# Patient Record
Sex: MALE | Race: WHITE | Employment: OTHER | URBAN - METROPOLITAN AREA
[De-identification: names, ages, dates, MRNs, and addresses within clinical notes are randomized per-mention and may not be internally consistent; named-entity substitution may affect disease eponyms.]

---

## 2017-03-31 ENCOUNTER — HOSPITAL ENCOUNTER (OUTPATIENT)
Dept: CARDIOLOGY | Age: 72
Discharge: HOME | End: 2017-03-31
Payer: COMMERCIAL

## 2017-03-31 PROCEDURE — 93280 PM DEVICE PROGR EVAL DUAL: CPT

## 2017-06-30 ENCOUNTER — HOSPITAL ENCOUNTER (OUTPATIENT)
Dept: CARDIOLOGY | Age: 72
Discharge: HOME | End: 2017-06-30
Payer: COMMERCIAL

## 2017-06-30 PROCEDURE — 93296 REM INTERROG EVL PM/IDS: CPT

## 2017-10-10 ENCOUNTER — HOSPITAL ENCOUNTER (OUTPATIENT)
Dept: CARDIOLOGY | Age: 72
Discharge: HOME | End: 2017-10-10
Payer: COMMERCIAL

## 2017-10-10 PROCEDURE — 93280 PM DEVICE PROGR EVAL DUAL: CPT

## 2018-01-09 ENCOUNTER — HOSPITAL ENCOUNTER (OUTPATIENT)
Dept: CARDIOLOGY | Age: 73
Discharge: HOME | End: 2018-01-09
Payer: COMMERCIAL

## 2018-01-09 PROCEDURE — 93296 REM INTERROG EVL PM/IDS: CPT

## 2018-04-10 ENCOUNTER — HOSPITAL ENCOUNTER (OUTPATIENT)
Dept: CARDIOLOGY | Age: 73
Discharge: HOME | End: 2018-04-10
Payer: COMMERCIAL

## 2018-04-10 PROCEDURE — 93280 PM DEVICE PROGR EVAL DUAL: CPT

## 2018-07-10 ENCOUNTER — HOSPITAL ENCOUNTER (OUTPATIENT)
Dept: CARDIOLOGY | Age: 73
Discharge: HOME | End: 2018-07-10
Payer: COMMERCIAL

## 2018-07-10 PROCEDURE — 93296 REM INTERROG EVL PM/IDS: CPT

## 2018-10-09 ENCOUNTER — HOSPITAL ENCOUNTER (OUTPATIENT)
Dept: CARDIOLOGY | Age: 73
Discharge: HOME | End: 2018-10-09
Payer: MEDICARE

## 2018-10-09 PROCEDURE — 93280 PM DEVICE PROGR EVAL DUAL: CPT

## 2019-01-08 ENCOUNTER — HOSPITAL ENCOUNTER (OUTPATIENT)
Dept: CARDIOLOGY | Age: 74
Discharge: HOME | End: 2019-01-08
Payer: COMMERCIAL

## 2019-01-08 PROCEDURE — 93296 REM INTERROG EVL PM/IDS: CPT

## 2019-04-10 ENCOUNTER — HOSPITAL ENCOUNTER (OUTPATIENT)
Dept: CARDIOLOGY | Age: 74
Discharge: HOME | End: 2019-04-10
Payer: COMMERCIAL

## 2019-04-10 PROCEDURE — 93280 PM DEVICE PROGR EVAL DUAL: CPT

## 2019-07-10 ENCOUNTER — HOSPITAL ENCOUNTER (OUTPATIENT)
Dept: CARDIOLOGY | Age: 74
Discharge: HOME | End: 2019-07-10
Payer: COMMERCIAL

## 2019-07-10 PROCEDURE — 93296 REM INTERROG EVL PM/IDS: CPT

## 2019-10-09 ENCOUNTER — HOSPITAL ENCOUNTER (OUTPATIENT)
Dept: CARDIOLOGY | Age: 74
Discharge: HOME | End: 2019-10-09
Payer: COMMERCIAL

## 2019-10-09 PROCEDURE — 93280 PM DEVICE PROGR EVAL DUAL: CPT

## 2019-10-16 LAB
ANION GAP SERPL CALCULATED.3IONS-SCNC: 12 MMOL/L (ref 10–20)
BICARBONATE: 30 MMOL/L (ref 21–32)
CHLORIDE BLD-SCNC: 104 MMOL/L (ref 98–107)
CHOLESTEROL/HDL RATIO: 2.8
CHOLESTEROL: 132 MG/DL (ref 0–199)
CK ISOENZYMES: 42 U/L (ref 0–325)
CREAT SERPL-MCNC: 1.02 MG/DL (ref 0.5–1.3)
GFR AFRICAN AMERICAN: >60 ML/MIN/1.73M2
GFR NON-AFRICAN AMERICAN: >60 ML/MIN/1.73M2
HDLC SERPL-MCNC: 48 MG/DL
LDL CHOLESTEROL: 60 MG/DL (ref 0–99)
POTASSIUM SERPL-SCNC: 4.5 MMOL/L (ref 3.5–5.3)
SODIUM BLD-SCNC: 141 MMOL/L (ref 136–145)
TRIGL SERPL-MCNC: 121 MG/DL (ref 0–149)
UREA NITROGEN: 13 MG/DL (ref 6–23)
VLDLC SERPL CALC-MCNC: 24 MG/DL (ref 0–40)

## 2020-01-09 ENCOUNTER — HOSPITAL ENCOUNTER (OUTPATIENT)
Dept: CARDIOLOGY | Age: 75
Discharge: HOME | End: 2020-01-09
Payer: MEDICARE

## 2020-01-09 PROCEDURE — 93296 REM INTERROG EVL PM/IDS: CPT

## 2020-01-14 LAB
ANION GAP SERPL CALCULATED.3IONS-SCNC: 13 MMOL/L (ref 10–20)
BICARBONATE: 30 MMOL/L (ref 21–32)
CHLORIDE BLD-SCNC: 102 MMOL/L (ref 98–107)
CREAT SERPL-MCNC: 1.2 MG/DL (ref 0.5–1.3)
ERYTHROCYTE [DISTWIDTH] IN BLOOD BY AUTOMATED COUNT: 13 % (ref 11.5–14)
GFR AFRICAN AMERICAN: 71 ML/MIN/1.73M2
GFR NON-AFRICAN AMERICAN: 59 ML/MIN/1.73M2
HCT VFR BLD CALC: 40.3 % (ref 41–52)
HEMOGLOBIN: 13 G/DL (ref 13.5–17)
MCHC RBC AUTO-ENTMCNC: 32.3 G/DL (ref 32–36)
MCV RBC AUTO: 100 FL (ref 80–100)
PLATELET # BLD: 147 X10E9/L (ref 150–450)
POTASSIUM SERPL-SCNC: 4.1 MMOL/L (ref 3.5–5.3)
RBC # BLD: 4.02 X10E12/L (ref 4.5–5.9)
SODIUM BLD-SCNC: 141 MMOL/L (ref 136–145)
UREA NITROGEN: 20 MG/DL (ref 6–23)
WBC: 4.8 X10E9/L (ref 4.4–11.3)

## 2020-01-17 ENCOUNTER — HOSPITAL ENCOUNTER (OUTPATIENT)
Dept: CARDIAC CATH/INVASIVE PROCEDURES | Age: 75
Discharge: HOME | End: 2020-01-17
Attending: INTERNAL MEDICINE | Admitting: INTERNAL MEDICINE
Payer: MEDICARE

## 2020-01-17 VITALS
DIASTOLIC BLOOD PRESSURE: 75 MMHG | OXYGEN SATURATION: 98 % | BODY MASS INDEX: 32.14 KG/M2 | TEMPERATURE: 98 F | HEIGHT: 66 IN | SYSTOLIC BLOOD PRESSURE: 147 MMHG | HEART RATE: 60 BPM | WEIGHT: 200 LBS | RESPIRATION RATE: 8 BRPM

## 2020-01-17 LAB
EKG ATRIAL RATE: 227 BPM
EKG Q-T INTERVAL: 462 MS
EKG QRS DURATION: 158 MS
EKG QTC CALCULATION (BAZETT): 484 MS
EKG R AXIS: 117 DEGREES
EKG T AXIS: -39 DEGREES
EKG VENTRICULAR RATE: 66 BPM

## 2020-01-17 PROCEDURE — 2500000003 HC RX 250 WO HCPCS

## 2020-01-17 PROCEDURE — 2580000003 HC RX 258: Performed by: INTERNAL MEDICINE

## 2020-01-17 PROCEDURE — 93005 ELECTROCARDIOGRAM TRACING: CPT

## 2020-01-17 PROCEDURE — 2580000003 HC RX 258

## 2020-01-17 PROCEDURE — 33228 REMV&REPLC PM GEN DUAL LEAD: CPT | Performed by: INTERNAL MEDICINE

## 2020-01-17 PROCEDURE — 6360000002 HC RX W HCPCS: Performed by: INTERNAL MEDICINE

## 2020-01-17 PROCEDURE — C1785 PMKR, DUAL, RATE-RESP: HCPCS

## 2020-01-17 PROCEDURE — 6360000002 HC RX W HCPCS

## 2020-01-17 PROCEDURE — 6370000000 HC RX 637 (ALT 250 FOR IP)

## 2020-01-17 PROCEDURE — 2709999900 HC NON-CHARGEABLE SUPPLY

## 2020-01-17 RX ORDER — SODIUM CHLORIDE 0.9 % (FLUSH) 0.9 %
10 SYRINGE (ML) INJECTION PRN
Status: DISCONTINUED | OUTPATIENT
Start: 2020-01-17 | End: 2020-01-17 | Stop reason: HOSPADM

## 2020-01-17 RX ORDER — CEFAZOLIN SODIUM 2 G/50ML
2 SOLUTION INTRAVENOUS ONCE
Status: COMPLETED | OUTPATIENT
Start: 2020-01-17 | End: 2020-01-17

## 2020-01-17 RX ORDER — SULFAMETHOXAZOLE AND TRIMETHOPRIM 400; 80 MG/1; MG/1
1 TABLET ORAL 2 TIMES DAILY
Qty: 6 TABLET | Refills: 0 | Status: SHIPPED | OUTPATIENT
Start: 2020-01-17 | End: 2020-01-23

## 2020-01-17 RX ORDER — SODIUM CHLORIDE 0.9 % (FLUSH) 0.9 %
10 SYRINGE (ML) INJECTION EVERY 12 HOURS SCHEDULED
Status: DISCONTINUED | OUTPATIENT
Start: 2020-01-17 | End: 2020-01-17 | Stop reason: HOSPADM

## 2020-01-17 RX ORDER — SODIUM CHLORIDE 9 MG/ML
INJECTION, SOLUTION INTRAVENOUS
Status: COMPLETED
Start: 2020-01-17 | End: 2020-01-17

## 2020-01-17 RX ORDER — ONDANSETRON 2 MG/ML
4 INJECTION INTRAMUSCULAR; INTRAVENOUS EVERY 6 HOURS PRN
Status: DISCONTINUED | OUTPATIENT
Start: 2020-01-17 | End: 2020-01-17 | Stop reason: HOSPADM

## 2020-01-17 RX ORDER — ACETAMINOPHEN 325 MG/1
650 TABLET ORAL EVERY 4 HOURS PRN
Status: DISCONTINUED | OUTPATIENT
Start: 2020-01-17 | End: 2020-01-17 | Stop reason: HOSPADM

## 2020-01-17 RX ORDER — CHLORHEXIDINE GLUCONATE 4 G/100ML
SOLUTION TOPICAL ONCE
Status: DISCONTINUED | OUTPATIENT
Start: 2020-01-17 | End: 2020-01-17 | Stop reason: HOSPADM

## 2020-01-17 RX ORDER — METOPROLOL TARTRATE 50 MG/1
100 TABLET, FILM COATED ORAL 2 TIMES DAILY
COMMUNITY

## 2020-01-17 RX ADMIN — SODIUM CHLORIDE 1000 MG: 9 INJECTION, SOLUTION INTRAVENOUS at 10:27

## 2020-01-17 RX ADMIN — SODIUM CHLORIDE 1000 ML: 9 INJECTION, SOLUTION INTRAVENOUS at 09:50

## 2020-01-17 RX ADMIN — CEFAZOLIN SODIUM 2 G: 2 SOLUTION INTRAVENOUS at 12:12

## 2020-01-17 RX ADMIN — DEXTROSE MONOHYDRATE 1 G: 5 INJECTION INTRAVENOUS at 10:02

## 2020-01-17 NOTE — PROCEDURES
Leidy Hernandez [address]                      [address], [address] [facility]                                 PROCEDURE NOTE    PATIENT NAME: Dari Youngblood                :        1945  MED REC NO:   [id number]                            ROOM:  ACCOUNT NO:   [de-identified]                           ADMIT DATE: 2020  PROVIDER:     Rogers Singer MD    DATE OF PROCEDURE:  2020    PROCEDURE:  Under fluoroscopic guidance, generator change. INDICATIONS:  The patient with sick sinus syndrome and carotid  hypersensitivity, the patient underwent pacemaker placement in . Generator is elective end of life. The patient was advised to consider  generator change. The risks of procedure including (if a new a lead is  needed), death, cardiac tamponade, pneumothorax, bleeding, infection,  lead dislodgement, thrombosis, and venous damage. He agreed to  procedure. OPERATIVE COURSE:  The patient was taken to the cardiac catheterization  lab at [facility]. The patient prepped and draped in  a sterile fashion. The patient was given IV sedation as well as 1%  Lidocaine for local anesthesia. The generator was identified under  fluoroscopic guidance. Sharp and blunt dissection was made down to the  generator can, the can was explanted. The pocket was copiously  irrigated out with antibiotic solution. The leads were found to be  adequate. A new generator was placed and 2-0 absorbable was used in  subcutaneous and fascial layers, 3-0 absorbable suture for the skin. Steri-strips, a sterile dressing and topical antibiotics were placed. At the time of this dictation, there were no apparent complications. The patient was discharged back to pre and postcatheterization area and  to look for any untoward hemodynamic effects. TECHNICAL DATA:  The atrial lead is a 5076-45 lead from [employer],  serial #[id number].   At 0.4 msec, voltage is 0.5 mV,

## 2020-01-17 NOTE — PROGRESS NOTES
Pt discharged home, left from pre/post via wheelchair, wife driving him home, discharge instructions explained and patient verbalized understanding.

## 2020-01-17 NOTE — PROGRESS NOTES
Patient arrived back to pre/post for recovery. VS WNLs. Incision to left chest area with Aquacel Ag dressing. Dressing has a little bit of blood on it from during procedure. Patient is alert and oriented. Wife at bedside.

## 2020-04-17 ENCOUNTER — HOSPITAL ENCOUNTER (OUTPATIENT)
Dept: CARDIOLOGY | Age: 75
Discharge: HOME | End: 2020-04-17
Payer: MEDICARE

## 2020-04-17 PROCEDURE — 93296 REM INTERROG EVL PM/IDS: CPT

## 2020-07-17 ENCOUNTER — HOSPITAL ENCOUNTER (OUTPATIENT)
Dept: CARDIOLOGY | Age: 75
Discharge: HOME | End: 2020-07-17
Payer: MEDICARE

## 2020-07-17 PROCEDURE — 93296 REM INTERROG EVL PM/IDS: CPT

## 2020-07-20 ENCOUNTER — NURSE ONLY (OUTPATIENT)
Dept: PRIMARY CARE CLINIC | Age: 75
End: 2020-07-20

## 2020-07-20 ENCOUNTER — HOSPITAL ENCOUNTER (OUTPATIENT)
Age: 75
Setting detail: SPECIMEN
Discharge: HOME | End: 2020-07-20
Payer: MEDICARE

## 2020-07-20 PROCEDURE — U0003 INFECTIOUS AGENT DETECTION BY NUCLEIC ACID (DNA OR RNA); SEVERE ACUTE RESPIRATORY SYNDROME CORONAVIRUS 2 (SARS-COV-2) (CORONAVIRUS DISEASE [COVID-19]), AMPLIFIED PROBE TECHNIQUE, MAKING USE OF HIGH THROUGHPUT TECHNOLOGIES AS DESCRIBED BY CMS-2020-01-R: HCPCS

## 2020-07-23 LAB
SARS-COV-2: NOT DETECTED
SOURCE: NORMAL

## 2020-07-24 ENCOUNTER — HOSPITAL ENCOUNTER (OUTPATIENT)
Dept: CARDIAC CATH/INVASIVE PROCEDURES | Age: 75
Discharge: HOME | End: 2020-07-24
Attending: INTERNAL MEDICINE | Admitting: INTERNAL MEDICINE
Payer: MEDICARE

## 2020-07-24 VITALS — OXYGEN SATURATION: 100 %

## 2020-07-24 PROBLEM — Z98.890 S/P CARDIAC CATH: Status: ACTIVE | Noted: 2020-07-24

## 2020-07-24 PROCEDURE — C1769 GUIDE WIRE: HCPCS

## 2020-07-24 PROCEDURE — 6360000004 HC RX CONTRAST MEDICATION: Performed by: INTERNAL MEDICINE

## 2020-07-24 PROCEDURE — 2580000003 HC RX 258

## 2020-07-24 PROCEDURE — 2709999900 HC NON-CHARGEABLE SUPPLY

## 2020-07-24 PROCEDURE — 2500000003 HC RX 250 WO HCPCS

## 2020-07-24 PROCEDURE — 6360000002 HC RX W HCPCS

## 2020-07-24 PROCEDURE — 93459 L HRT ART/GRFT ANGIO: CPT | Performed by: INTERNAL MEDICINE

## 2020-07-24 PROCEDURE — C1894 INTRO/SHEATH, NON-LASER: HCPCS

## 2020-07-24 PROCEDURE — C1760 CLOSURE DEV, VASC: HCPCS

## 2020-07-24 RX ORDER — SODIUM CHLORIDE 0.9 % (FLUSH) 0.9 %
10 SYRINGE (ML) INJECTION PRN
Status: DISCONTINUED | OUTPATIENT
Start: 2020-07-24 | End: 2020-07-25 | Stop reason: HOSPADM

## 2020-07-24 RX ORDER — SODIUM CHLORIDE 0.9 % (FLUSH) 0.9 %
10 SYRINGE (ML) INJECTION EVERY 12 HOURS SCHEDULED
Status: DISCONTINUED | OUTPATIENT
Start: 2020-07-24 | End: 2020-07-25 | Stop reason: HOSPADM

## 2020-07-24 RX ORDER — ACETAMINOPHEN 325 MG/1
650 TABLET ORAL EVERY 4 HOURS PRN
Status: DISCONTINUED | OUTPATIENT
Start: 2020-07-24 | End: 2020-07-25 | Stop reason: HOSPADM

## 2020-07-24 RX ORDER — SODIUM CHLORIDE 9 MG/ML
INJECTION, SOLUTION INTRAVENOUS CONTINUOUS
Status: DISCONTINUED | OUTPATIENT
Start: 2020-07-24 | End: 2020-07-25 | Stop reason: HOSPADM

## 2020-07-24 RX ORDER — NITROGLYCERIN 0.4 MG/1
0.4 TABLET SUBLINGUAL EVERY 5 MIN PRN
COMMUNITY

## 2020-07-24 RX ADMIN — IOPAMIDOL 125 ML: 612 INJECTION, SOLUTION INTRAVENOUS at 18:03

## 2020-07-24 NOTE — OP NOTE
INDICATIONS:  The patient is a 69 y.o. male with history of coronary artery disease, remote coronary artery bypass grafting, sinus node dysfunction, permanent pacemaker, nonsustained ventricular tachycardia by pacemaker check, primary cardiologist Dr. Elham Mejia recommended cardiac catheterization and possible intervention. PROCEDURE:  Left heart catheterization, coronary angiography, left ventriculography ,LV/Ao pull back and selective right common femoral angiography was performed. Patient underwent selective angiography of the left internal mammary artery ,saphenous vein graft to the ramus intermedius branch and saphenous vein graft to the right coronary artery. Benefits, alternatives and risks of the procedure were explained to the patient to include but not limited to MI, Stroke, Death, Allergic reaction to dye, bleeding, risk of kidney injury, and possible need for emergent coronary artery bypass grafting and informed consent was obtained. The patient was brought to the cath lab and was prepped and draped in the normal sterile technique. IV conscious sedation was maintained. 1% lidocaine was used for local anesthesia. DESCRIPTION OF PROCEDURE: Under local anesthesia, a 5-Cape Verdean short sheath was placed in the right common femoral artery by single anterior percutaneous stick. Selective right common femoral angiography showed that the access was in the right common femoral artery above its bifurcation. Access was very close to the bifurcation. There was significant calcification. There was aortoiliac tortuosity. The 5 [place] Verdean short sheath was therefore changed to a 5 Western Monserrat 25 cm sheath. A 5-C[place] Verdean JL4 diagnostic catheter was advanced over a 0.035 guidewire and the left main coronary artery was selectively engaged. Angiography of the left system was performed in multiple orthogonal views. The 5-C[place] Verdean JL4 diagnostic catheter and the guidewire were removed.  A 5-C[place] Verdean AR mod  diagnostic catheter was advanced over a 0.035 guidewire and the right coronary artery was selectively engaged. Angiography of the right coronary artery was performed in multiple orthogonal views. The 5 [place] Monserrat AR mod diagnostic catheter was then used to selectively engage the saphenous vein graft to the ramus intermedius branch. Angiography of the graft was performed in multiple orthogonal views. The catheter was then used to engage the saphenous vein graft to the right coronary artery, but was suboptimal.  The catheter was removed. 5 Western Monserrat multipurpose diagnostic catheter was advanced, and SVG to the right coronary artery was selectively engaged. Angiography was performed in multiple orthogonal views. The catheter was removed. 5 Western Monserrat LIMA diagnostic catheter was advanced under fluoroscopy, and the left internal mammary artery was selectively engaged. Angiography of the left internal mammary artery was performed in multiple orthogonal views. The catheter was removed. A 5-Sinhala angled pigtail catheter was advanced over a 0.035 guidewire across the aortic valve into the left ventricle. Left ventricular end-diastolic pressure was measured. Left ventriculography was performed in about 30° CHARLES view. Slow manual pullback was performed across the aortic valve. At the conclusion of the procedure the right femoral artery sheath was removed and hemostasis was achieved using Minx  hemostatic device. There were no immediate complications. HEMODYNAMIC / ANGIOGRAPHIC DATA:    1. Left ventricular end diastolic pressure was 15 to 20  mmHg. No systolic gradient across the aortic valve. 2. Left ventriculography revealed an EF around 60 %. In the CHARLES view, there is basal inferior akinesis. 3. The left main coronary artery  has 0% stenosis. Ricka Distad 4. The left anterior descending artery has diffuse calcification.   Left anterior descending artery is 100% occluded after moderately large first septal  and the first diagonal branch. Proximal left anterior descending artery has a calcified focal 70% stenosis, at the bend in the vessel, best visualized in the BRAXTON caudal view. The first septal  and the first diagonal branch have less than 10% stenosis  5. LIMA to LAD is widely patent. Distal LAD is of small caliber. Distal LAD has less than 10% stenosis. Kvng Sinclair 6. The left circumflex is nondominant. Proximal vessel has less than 10% stenosis left circumflex artery gives off a large obtuse marginal branch that bifurcates into 2 secondary branches each of which measure 2.25 to 2.5 mm. Distal left circumflex artery is small in caliber. Left circumflex system has less than 10% stenosis. 7. The right coronary artery is dominant, 100% occluded proximally. 8. SVG to ramus intermedius branch is patent, the ramus intermedius branch itself is of relatively small caliber. There is good runoff distally. The ramus intermedius branch has less than 10% stenosis  9. SVG to the right coronary artery is widely patent, with excellent distal runoff. The posterior descending artery and the posterolateral ventricular branches have less than 10% stenosis. In summary this patient has total occlusion of the proximal RCA patent SVG to distal RCA normal left main minor disease of the circumflex patent SVG to the ramus intermedius branch, heavily calcified proximal LAD with 70% stenosis at the bend in the vessel, patent LIMA to LAD, overall left ventricular ejection fraction of about 55 to 60% with basal inferior akinesis, LVEDP 15 to 20 mmHg. RECOMMENDATIONS:  Post-procedure care will focus on prevention of any ischemic events and congestive complications. Aggressive risk factor modification and dual antiplatelet therapy are recommended. Patient will follow-up with Dr. Laz Boo as previously scheduled.     Angélica Barboza MD

## 2020-07-24 NOTE — PROGRESS NOTES
Patient received to pre post cath. Alert and oriented x4. Very pleasant and free from complaint. Right groin dressing clean dry intact. Right groin soft without hematoma or bleeding. 2+ palpable DP bilateral.  Dr. Jens Josue at bedside to say hello. Patient calling wife to speak about DC home tonight.

## 2020-07-25 NOTE — FLOWSHEET NOTE
1930: Patient arrived on floor from cath lab. Right groin dressing dry and intact. Site soft, non tender. No sign of hematoma or bruising present. Patient denies pain at site. Patient to remain on bedrest until 2200.     2015: Call to  to get discharge order. Order given to discharge patient tonight. 2155: Right groin reassessed. Site remains stable. No signs of hematoma or bruising present. Site soft, non tender. 2205: Patient discharged off floor via wheelchair.

## 2020-10-16 ENCOUNTER — HOSPITAL ENCOUNTER (OUTPATIENT)
Dept: CARDIOLOGY | Age: 75
Discharge: HOME | End: 2020-10-16
Payer: MEDICARE

## 2020-10-16 PROCEDURE — 93280 PM DEVICE PROGR EVAL DUAL: CPT

## 2021-01-15 ENCOUNTER — HOSPITAL ENCOUNTER (OUTPATIENT)
Dept: CARDIOLOGY | Age: 76
Discharge: HOME | End: 2021-01-15
Payer: MEDICARE

## 2021-01-15 PROCEDURE — 93296 REM INTERROG EVL PM/IDS: CPT

## 2021-03-15 LAB — C-REACTIVE PROTEIN: 3.62 MG/DL

## 2021-05-13 ENCOUNTER — HOSPITAL ENCOUNTER (OUTPATIENT)
Dept: CARDIOLOGY | Age: 76
Discharge: HOME | End: 2021-05-13
Payer: MEDICARE

## 2021-05-13 PROCEDURE — 93296 REM INTERROG EVL PM/IDS: CPT

## 2021-07-30 LAB
ANION GAP SERPL CALCULATED.3IONS-SCNC: 10 MEQ/L (ref 9–15)
BUN BLDV-MCNC: 18 MG/DL (ref 8–23)
CHLORIDE BLD-SCNC: 106 MEQ/L (ref 95–107)
CHOLESTEROL, TOTAL: 136 MG/DL (ref 0–199)
CO2: 26 MEQ/L (ref 20–31)
CREAT SERPL-MCNC: 1.03 MG/DL (ref 0.7–1.2)
GFR AFRICAN AMERICAN: >60
GFR NON-AFRICAN AMERICAN: >60
HDLC SERPL-MCNC: 51 MG/DL (ref 40–59)
LDL CHOLESTEROL CALCULATED: 66 MG/DL (ref 0–129)
MAGNESIUM: 2.3 MG/DL (ref 1.7–2.4)
POTASSIUM SERPL-SCNC: 4.3 MEQ/L (ref 3.4–4.9)
SODIUM BLD-SCNC: 142 MEQ/L (ref 135–144)
TOTAL CK: 59 U/L (ref 0–190)
TRIGL SERPL-MCNC: 97 MG/DL (ref 0–150)

## 2021-08-17 ENCOUNTER — HOSPITAL ENCOUNTER (OUTPATIENT)
Dept: CARDIOLOGY | Age: 76
Discharge: HOME | End: 2021-08-17
Payer: MEDICARE

## 2021-08-17 PROCEDURE — 93296 REM INTERROG EVL PM/IDS: CPT

## 2021-11-16 ENCOUNTER — HOSPITAL ENCOUNTER (OUTPATIENT)
Dept: CARDIOLOGY | Age: 76
Discharge: HOME | End: 2021-11-16
Payer: MEDICARE

## 2021-11-16 PROCEDURE — 93296 REM INTERROG EVL PM/IDS: CPT

## 2022-02-16 ENCOUNTER — HOSPITAL ENCOUNTER (OUTPATIENT)
Dept: CARDIOLOGY | Age: 77
Discharge: HOME | End: 2022-02-16
Payer: MEDICARE

## 2022-02-16 PROCEDURE — 93280 PM DEVICE PROGR EVAL DUAL: CPT

## 2022-05-18 ENCOUNTER — HOSPITAL ENCOUNTER (OUTPATIENT)
Dept: CARDIOLOGY | Age: 77
Discharge: HOME | End: 2022-05-18
Payer: MEDICARE

## 2022-05-18 PROCEDURE — 93296 REM INTERROG EVL PM/IDS: CPT

## 2022-08-17 ENCOUNTER — HOSPITAL ENCOUNTER (OUTPATIENT)
Dept: CARDIOLOGY | Age: 77
Discharge: HOME | End: 2022-08-17
Payer: MEDICARE

## 2022-08-17 PROCEDURE — 93280 PM DEVICE PROGR EVAL DUAL: CPT

## 2022-11-16 ENCOUNTER — HOSPITAL ENCOUNTER (OUTPATIENT)
Dept: CARDIOLOGY | Age: 77
Discharge: HOME | End: 2022-11-16
Payer: MEDICARE

## 2022-11-16 PROCEDURE — 93296 REM INTERROG EVL PM/IDS: CPT

## 2023-01-27 LAB
CK ISOENZYMES: 43 U/L (ref 0–325)
MAGNESIUM: 2.15 MG/DL (ref 1.6–2.4)

## 2023-02-16 ENCOUNTER — HOSPITAL ENCOUNTER (OUTPATIENT)
Dept: CARDIOLOGY | Age: 78
Discharge: HOME | End: 2023-02-16
Payer: MEDICARE

## 2023-02-16 PROCEDURE — 93296 REM INTERROG EVL PM/IDS: CPT

## 2023-02-22 LAB
ANION GAP IN SER/PLAS: 11 MMOL/L (ref 10–20)
CALCIUM (MG/DL) IN SER/PLAS: 9.8 MG/DL (ref 8.6–10.3)
CARBON DIOXIDE, TOTAL (MMOL/L) IN SER/PLAS: 29 MMOL/L (ref 21–32)
CHLORIDE (MMOL/L) IN SER/PLAS: 106 MMOL/L (ref 98–107)
CHOLESTEROL (MG/DL) IN SER/PLAS: 115 MG/DL (ref 0–199)
CHOLESTEROL IN HDL (MG/DL) IN SER/PLAS: 50.1 MG/DL
CHOLESTEROL/HDL RATIO: 2.3
CREATINE KINASE (U/L) IN SER/PLAS: 39 U/L (ref 0–325)
CREATININE (MG/DL) IN SER/PLAS: 1.04 MG/DL (ref 0.5–1.3)
GFR MALE: 75 ML/MIN/1.73M2
GLUCOSE (MG/DL) IN SER/PLAS: 131 MG/DL (ref 74–99)
LDL: 47 MG/DL (ref 0–99)
MAGNESIUM (MG/DL) IN SER/PLAS: 2.45 MG/DL (ref 1.6–2.4)
POTASSIUM (MMOL/L) IN SER/PLAS: 4.9 MMOL/L (ref 3.5–5.3)
SODIUM (MMOL/L) IN SER/PLAS: 141 MMOL/L (ref 136–145)
TRIGLYCERIDE (MG/DL) IN SER/PLAS: 90 MG/DL (ref 0–149)
UREA NITROGEN (MG/DL) IN SER/PLAS: 18 MG/DL (ref 6–23)
VLDL: 18 MG/DL (ref 0–40)

## 2023-03-13 NOTE — PROCEDURES
Mirtha Medina is a 69 y.o. male patient. No diagnosis found. Past Medical History:   Diagnosis Date    Arthritis     Chicken pox     ED (erectile dysfunction)     Gas     Hay fever     Heart disease     Hemorrhoids     High blood pressure     High cholesterol     History of bronchitis     History of pneumonia     Hoarseness     Hydrocele     Indigestion     Loss of hearing     Measles     Mumps     Pacemaker     Swelling of both ankles     Thyroid trouble     Tonsillitis      Blood pressure 128/77, temperature 98 °F (36.7 °C), temperature source Temporal, resp. rate 18, height 5' 6\" (1.676 m), weight 200 lb (90.7 kg), SpO2 97 %. Procedures   See full dictation on January 17. Assessment:  Patient underwent successful generator change. The patient had adequate pacing and sensing thresholds in both the atrial and ventricular lead. The ventricular lead is epicardial and not MRI compatible. So even though this patient has an MRI compatible generator, due to the ventricular lead, he would not be a candidate for routine MRI. Plan:  1 more dose of IV antibiotics, then discharged home with oral Bactrim for 3 days. Hold aspirin for a couple of days. Pacemaker incision check will be scheduled for next week.     Jaxson Fletcher MD  1/17/2020 FRANCIS Garcia PA-C

## 2023-04-10 LAB
ALBUMIN (G/DL) IN SER/PLAS: 4 G/DL (ref 3.4–5)
ANION GAP IN SER/PLAS: 12 MMOL/L (ref 10–20)
CALCIUM (MG/DL) IN SER/PLAS: 9.4 MG/DL (ref 8.6–10.3)
CARBON DIOXIDE, TOTAL (MMOL/L) IN SER/PLAS: 28 MMOL/L (ref 21–32)
CHLORIDE (MMOL/L) IN SER/PLAS: 106 MMOL/L (ref 98–107)
CREATININE (MG/DL) IN SER/PLAS: 1.06 MG/DL (ref 0.5–1.3)
ERYTHROCYTE DISTRIBUTION WIDTH (RATIO) BY AUTOMATED COUNT: 13.2 % (ref 11.5–14.5)
ERYTHROCYTE MEAN CORPUSCULAR HEMOGLOBIN CONCENTRATION (G/DL) BY AUTOMATED: 32.4 G/DL (ref 32–36)
ERYTHROCYTE MEAN CORPUSCULAR VOLUME (FL) BY AUTOMATED COUNT: 100 FL (ref 80–100)
ERYTHROCYTES (10*6/UL) IN BLOOD BY AUTOMATED COUNT: 4.19 X10E12/L (ref 4.5–5.9)
GFR MALE: 73 ML/MIN/1.73M2
GLUCOSE (MG/DL) IN SER/PLAS: 138 MG/DL (ref 74–99)
HEMATOCRIT (%) IN BLOOD BY AUTOMATED COUNT: 42 % (ref 41–52)
HEMOGLOBIN (G/DL) IN BLOOD: 13.6 G/DL (ref 13.5–17.5)
LEUKOCYTES (10*3/UL) IN BLOOD BY AUTOMATED COUNT: 4.9 X10E9/L (ref 4.4–11.3)
PHOSPHATE (MG/DL) IN SER/PLAS: 3.2 MG/DL (ref 2.5–4.9)
PLATELETS (10*3/UL) IN BLOOD AUTOMATED COUNT: 142 X10E9/L (ref 150–450)
POTASSIUM (MMOL/L) IN SER/PLAS: 4.8 MMOL/L (ref 3.5–5.3)
PROSTATE SPECIFIC ANTIGEN,SCREEN: 0.57 NG/ML (ref 0–4)
SODIUM (MMOL/L) IN SER/PLAS: 141 MMOL/L (ref 136–145)
THYROTROPIN (MIU/L) IN SER/PLAS BY DETECTION LIMIT <= 0.05 MIU/L: 1.73 MIU/L (ref 0.44–3.98)
UREA NITROGEN (MG/DL) IN SER/PLAS: 16 MG/DL (ref 6–23)

## 2023-05-19 ENCOUNTER — HOSPITAL ENCOUNTER (OUTPATIENT)
Dept: CARDIOLOGY | Age: 78
Discharge: HOME | End: 2023-05-19
Payer: MEDICARE

## 2023-05-19 PROCEDURE — 93280 PM DEVICE PROGR EVAL DUAL: CPT

## 2023-07-17 LAB
ALANINE AMINOTRANSFERASE (SGPT) (U/L) IN SER/PLAS: 22 U/L (ref 10–52)
ALBUMIN (G/DL) IN SER/PLAS: 4.1 G/DL (ref 3.4–5)
ALBUMIN (MG/L) IN URINE: 11 MG/L
ALBUMIN/CREATININE (UG/MG) IN URINE: 10.4 UG/MG CRT (ref 0–30)
ALKALINE PHOSPHATASE (U/L) IN SER/PLAS: 75 U/L (ref 33–136)
ANION GAP IN SER/PLAS: 10 MMOL/L (ref 10–20)
ASPARTATE AMINOTRANSFERASE (SGOT) (U/L) IN SER/PLAS: 20 U/L (ref 9–39)
BILIRUBIN TOTAL (MG/DL) IN SER/PLAS: 1.4 MG/DL (ref 0–1.2)
CALCIDIOL (25 OH VITAMIN D3) (NG/ML) IN SER/PLAS: 50 NG/ML
CALCIUM (MG/DL) IN SER/PLAS: 9.1 MG/DL (ref 8.6–10.3)
CARBON DIOXIDE, TOTAL (MMOL/L) IN SER/PLAS: 27 MMOL/L (ref 21–32)
CHLORIDE (MMOL/L) IN SER/PLAS: 108 MMOL/L (ref 98–107)
CHOLESTEROL (MG/DL) IN SER/PLAS: 120 MG/DL (ref 0–199)
CHOLESTEROL IN HDL (MG/DL) IN SER/PLAS: 51 MG/DL
CHOLESTEROL/HDL RATIO: 2.4
CREATININE (MG/DL) IN SER/PLAS: 1.21 MG/DL (ref 0.5–1.3)
CREATININE (MG/DL) IN URINE: 106 MG/DL (ref 20–370)
ESTIMATED AVERAGE GLUCOSE FOR HBA1C: 151 MG/DL
GFR MALE: 62 ML/MIN/1.73M2
GLUCOSE (MG/DL) IN SER/PLAS: 137 MG/DL (ref 74–99)
HEMOGLOBIN A1C/HEMOGLOBIN TOTAL IN BLOOD: 6.9 %
LDL: 54 MG/DL (ref 0–99)
POTASSIUM (MMOL/L) IN SER/PLAS: 4.4 MMOL/L (ref 3.5–5.3)
PROTEIN TOTAL: 6.7 G/DL (ref 6.4–8.2)
SODIUM (MMOL/L) IN SER/PLAS: 141 MMOL/L (ref 136–145)
THYROTROPIN (MIU/L) IN SER/PLAS BY DETECTION LIMIT <= 0.05 MIU/L: 3.26 MIU/L (ref 0.44–3.98)
THYROXINE (T4) FREE (NG/DL) IN SER/PLAS: 0.81 NG/DL (ref 0.61–1.12)
TRIGLYCERIDE (MG/DL) IN SER/PLAS: 77 MG/DL (ref 0–149)
UREA NITROGEN (MG/DL) IN SER/PLAS: 15 MG/DL (ref 6–23)
VLDL: 15 MG/DL (ref 0–40)

## 2023-08-17 ENCOUNTER — HOSPITAL ENCOUNTER (OUTPATIENT)
Dept: CARDIOLOGY | Age: 78
Discharge: HOME | End: 2023-08-17
Payer: MEDICARE

## 2023-08-17 PROCEDURE — 93296 REM INTERROG EVL PM/IDS: CPT

## 2023-10-13 ENCOUNTER — LAB (OUTPATIENT)
Dept: LAB | Facility: LAB | Age: 78
End: 2023-10-13
Payer: MEDICARE

## 2023-10-13 DIAGNOSIS — J44.9 CHRONIC OBSTRUCTIVE PULMONARY DISEASE, UNSPECIFIED (MULTI): ICD-10-CM

## 2023-10-13 DIAGNOSIS — Z12.5 ENCOUNTER FOR SCREENING FOR MALIGNANT NEOPLASM OF PROSTATE: Primary | ICD-10-CM

## 2023-10-13 DIAGNOSIS — Z12.5 ENCOUNTER FOR SCREENING FOR MALIGNANT NEOPLASM OF PROSTATE: ICD-10-CM

## 2023-10-13 DIAGNOSIS — I10 ESSENTIAL (PRIMARY) HYPERTENSION: ICD-10-CM

## 2023-10-13 LAB
ERYTHROCYTE [DISTWIDTH] IN BLOOD BY AUTOMATED COUNT: 13.4 % (ref 11.5–14.5)
HCT VFR BLD AUTO: 41.9 % (ref 41–52)
HGB BLD-MCNC: 13.5 G/DL (ref 13.5–17.5)
MCH RBC QN AUTO: 32.7 PG (ref 26–34)
MCHC RBC AUTO-ENTMCNC: 32.2 G/DL (ref 32–36)
MCV RBC AUTO: 102 FL (ref 80–100)
NRBC BLD-RTO: 0 /100 WBCS (ref 0–0)
PLATELET # BLD AUTO: 134 X10*3/UL (ref 150–450)
PMV BLD AUTO: 10.2 FL (ref 7.5–11.5)
RBC # BLD AUTO: 4.13 X10*6/UL (ref 4.5–5.9)
WBC # BLD AUTO: 4.6 X10*3/UL (ref 4.4–11.3)

## 2023-10-13 PROCEDURE — 85027 COMPLETE CBC AUTOMATED: CPT

## 2023-10-13 PROCEDURE — 36415 COLL VENOUS BLD VENIPUNCTURE: CPT

## 2023-10-16 ENCOUNTER — LAB (OUTPATIENT)
Dept: LAB | Facility: LAB | Age: 78
End: 2023-10-16
Payer: MEDICARE

## 2023-10-16 DIAGNOSIS — J44.9 CHRONIC OBSTRUCTIVE PULMONARY DISEASE, UNSPECIFIED (MULTI): ICD-10-CM

## 2023-10-16 DIAGNOSIS — Z12.5 ENCOUNTER FOR SCREENING FOR MALIGNANT NEOPLASM OF PROSTATE: ICD-10-CM

## 2023-10-16 DIAGNOSIS — I10 ESSENTIAL (PRIMARY) HYPERTENSION: ICD-10-CM

## 2023-10-16 LAB — PSA SERPL-MCNC: 0.55 NG/ML

## 2023-10-16 PROCEDURE — G0103 PSA SCREENING: HCPCS

## 2023-10-16 PROCEDURE — 36415 COLL VENOUS BLD VENIPUNCTURE: CPT

## 2023-11-07 PROBLEM — I44.0 FIRST DEGREE AV BLOCK: Status: ACTIVE | Noted: 2023-11-07

## 2023-11-07 PROBLEM — Z95.1 S/P CABG X 3: Status: ACTIVE | Noted: 2023-11-07

## 2023-11-07 PROBLEM — I47.29 NSVT (NONSUSTAINED VENTRICULAR TACHYCARDIA) (MULTI): Status: ACTIVE | Noted: 2023-11-07

## 2023-11-07 PROBLEM — Z86.79 HISTORY OF CAROTID ARTERY STENOSIS: Status: ACTIVE | Noted: 2023-11-07

## 2023-11-07 PROBLEM — I35.1 AORTIC VALVE INSUFFICIENCY, ACQUIRED: Status: ACTIVE | Noted: 2023-11-07

## 2023-11-07 PROBLEM — G90.01: Status: ACTIVE | Noted: 2023-11-07

## 2023-11-07 PROBLEM — E11.9 DIABETES MELLITUS (MULTI): Status: ACTIVE | Noted: 2023-11-07

## 2023-11-07 PROBLEM — I25.10 CAD (CORONARY ARTERY DISEASE): Status: ACTIVE | Noted: 2023-11-07

## 2023-11-07 PROBLEM — E78.5 HYPERLIPIDEMIA: Status: ACTIVE | Noted: 2023-11-07

## 2023-11-07 PROBLEM — I45.10 RIGHT BUNDLE BRANCH BLOCK (RBBB): Status: ACTIVE | Noted: 2023-11-07

## 2023-11-07 PROBLEM — I10 HYPERTENSION: Status: ACTIVE | Noted: 2023-11-07

## 2023-11-07 PROBLEM — I65.22 ASYMPTOMATIC STENOSIS OF LEFT CAROTID ARTERY: Status: ACTIVE | Noted: 2023-11-07

## 2023-11-07 PROBLEM — Z95.828 PRESENCE OF STENT IN ARTERY: Status: ACTIVE | Noted: 2023-11-07

## 2023-11-07 RX ORDER — METOPROLOL SUCCINATE 100 MG/1
TABLET, EXTENDED RELEASE ORAL 2 TIMES DAILY
COMMUNITY
End: 2024-04-18 | Stop reason: SDUPTHER

## 2023-11-07 RX ORDER — NITROGLYCERIN 0.4 MG/1
TABLET SUBLINGUAL
COMMUNITY

## 2023-11-07 RX ORDER — ASPIRIN 81 MG/1
1 TABLET ORAL DAILY
COMMUNITY

## 2023-11-07 RX ORDER — ATORVASTATIN CALCIUM 40 MG/1
40 TABLET, FILM COATED ORAL NIGHTLY
COMMUNITY
Start: 2023-08-26 | End: 2024-02-19

## 2023-11-07 RX ORDER — LOSARTAN POTASSIUM 25 MG/1
25 TABLET ORAL DAILY
COMMUNITY
Start: 2023-08-26 | End: 2024-02-19

## 2023-11-07 RX ORDER — LANOLIN ALCOHOL/MO/W.PET/CERES
1 CREAM (GRAM) TOPICAL DAILY
COMMUNITY

## 2023-11-07 RX ORDER — DAPAGLIFLOZIN 10 MG/1
1 TABLET, FILM COATED ORAL DAILY
COMMUNITY
Start: 2022-05-10

## 2023-11-07 RX ORDER — EZETIMIBE 10 MG/1
1 TABLET ORAL DAILY
COMMUNITY
End: 2024-02-19

## 2023-11-07 RX ORDER — ACETAMINOPHEN 500 MG
1 TABLET ORAL DAILY
COMMUNITY

## 2023-11-07 RX ORDER — METFORMIN HYDROCHLORIDE 500 MG/1
1 TABLET ORAL 2 TIMES DAILY
COMMUNITY

## 2023-11-27 ENCOUNTER — OFFICE VISIT (OUTPATIENT)
Dept: CARDIOLOGY | Facility: CLINIC | Age: 78
End: 2023-11-27
Payer: MEDICARE

## 2023-11-27 VITALS
HEART RATE: 77 BPM | WEIGHT: 200 LBS | HEIGHT: 67 IN | DIASTOLIC BLOOD PRESSURE: 75 MMHG | SYSTOLIC BLOOD PRESSURE: 129 MMHG | BODY MASS INDEX: 31.39 KG/M2

## 2023-11-27 DIAGNOSIS — I25.85 CHRONIC CORONARY MICROVASCULAR DYSFUNCTION: ICD-10-CM

## 2023-11-27 DIAGNOSIS — I47.29 NSVT (NONSUSTAINED VENTRICULAR TACHYCARDIA) (MULTI): Primary | ICD-10-CM

## 2023-11-27 DIAGNOSIS — Z87.891 FORMER SMOKER: ICD-10-CM

## 2023-11-27 DIAGNOSIS — I44.0 FIRST DEGREE AV BLOCK: ICD-10-CM

## 2023-11-27 DIAGNOSIS — Z95.0 PACEMAKER: ICD-10-CM

## 2023-11-27 DIAGNOSIS — I1A.0 RESISTANT HYPERTENSION: ICD-10-CM

## 2023-11-27 PROCEDURE — 3074F SYST BP LT 130 MM HG: CPT | Performed by: INTERNAL MEDICINE

## 2023-11-27 PROCEDURE — 1036F TOBACCO NON-USER: CPT | Performed by: INTERNAL MEDICINE

## 2023-11-27 PROCEDURE — 99214 OFFICE O/P EST MOD 30 MIN: CPT | Performed by: INTERNAL MEDICINE

## 2023-11-27 PROCEDURE — 3078F DIAST BP <80 MM HG: CPT | Performed by: INTERNAL MEDICINE

## 2023-11-27 PROCEDURE — 1159F MED LIST DOCD IN RCRD: CPT | Performed by: INTERNAL MEDICINE

## 2023-11-27 NOTE — PROGRESS NOTES
CARDIOLOGY OFFICE VISIT      CHIEF COMPLAINT  Chief Complaint   Patient presents with    Follow-up       HISTORY OF PRESENT ILLNESS  HPI    78-year-old male with a past medical history of hypertension. He has a history of coronary artery disease with prior myocardial infarction in 2006 with subsequent percutaneous coronary interventions and stenting of the right coronary artery and then subsequent coronary bypass graft surgery October 2006 with a left internal mammary artery to left anterior descending artery, saphenous vein graft to the ramus and right coronary artery. Stress test in 2014 shows no evidence of ischemia with a left ventricular ejection fraction 72%. History of hyperlipidemia. 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 His device has been showing episodes of supraventricular tachycardia with brief episodes of ventricular arrhythmias (nonsustained) for the last few years. Since 2020, the device has been showing episodes of nonsustained ventricular tachycardia with episodes up to 9 seconds of duration. His cardiac data includes a cardiac catheterization performed July 2020 that shows left ventricular ejection fraction 60%. Left main artery normal. Left anterior descending with 100% occluded after moderately large first septal  and first of another branch. Proximal left anterior descending artery has 70% stenosis. LIMA to LAD widely patent. Circumflex nondominant. Proximal vessel less than 10% stenosis. Right coronary artery 100% occluded proximally. SVG to ramus intermedius patent. SVG to right coronary artery widely patent with excellent distal runoff. Medical therapy was recommended. In the last week visit he has been doing well. He denies any symptoms of chest pain or shortness of breath or  palpitations.    Echocardiogram performed March 2023 shows left ventricular ejection fraction of 60%. 1-2+ tricuspid regurgitation with mild concentric ventricular hypertrophy.    Since the last office visit, he has been doing well.  He denies any symptoms of chest pain or shortness of breath or palpitations.    Patient had a TSH in July 2023 that shows 3.76.  LFTs normal.  Creatinine 1.29.    Patient states that he had a device interrogation recently at [facility].  This is not available during this evaluation.    Past Medical History  No past medical history on file.    Social History  Social History     Tobacco Use    Smoking status: Former     Types: Cigarettes    Smokeless tobacco: Never   Substance Use Topics    Alcohol use: Yes    Drug use: Not Currently       Family History   No family history on file.     Allergies:  Allergies   Allergen Reactions    Ciprofloxacin Shortness of breath    Tramadol Dizziness and Shortness of breath    Shellfish Containing Products Itching        Outpatient Medications:  Current Outpatient Medications   Medication Instructions    aspirin 81 mg EC tablet 1 tablet, oral, Daily    atorvastatin (LIPITOR) 40 mg, oral, Nightly    cholecalciferol (Vitamin D3) 5,000 Units tablet 1 tablet, oral, Daily    dapagliflozin propanediol (Farxiga) 10 mg 1 tablet, oral, Daily    ezetimibe (Zetia) 10 mg tablet 1 tablet, oral, Daily    losartan (COZAAR) 25 mg, oral, Daily    magnesium oxide (Mag-Ox) 400 mg (241.3 mg magnesium) tablet 1 tablet, oral, Daily    metFORMIN (Glucophage) 500 mg tablet 1 tablet, oral, 2 times daily    metoprolol succinate XL (Toprol-XL) 100 mg 24 hr tablet oral, 2 times daily    nitroglycerin (Nitrostat) 0.4 mg SL tablet sublingual          REVIEW OF SYSTEMS  Review of Systems   All other systems reviewed and are negative.      VITALS  Vitals:    11/27/23 0942   BP: 129/75   Pulse: 77       PHYSICAL EXAM  Constitutional:       Appearance: Healthy appearance. Not  in distress.   Neck:      Vascular: No JVR. JVD normal.   Pulmonary:      Effort: Pulmonary effort is normal.      Breath sounds: Normal breath sounds. No wheezing. No rhonchi. No rales.   Chest:      Chest wall: Not tender to palpatation.   Cardiovascular:      PMI at left midclavicular line. Normal rate. Regular rhythm. Normal S1. Normal S2.       Murmurs: There is no murmur.      No gallop.  No click. No rub.   Pulses:     Intact distal pulses.   Edema:     Peripheral edema absent.   Abdominal:      General: Bowel sounds are normal.      Palpations: Abdomen is soft.      Tenderness: There is no abdominal tenderness.   Musculoskeletal: Normal range of motion.         General: No tenderness. Skin:     General: Skin is warm and dry.   Neurological:      General: No focal deficit present.      Mental Status: Alert and oriented to person, place and time.       ASSESSMENT AND PLAN    Clinical impression    1. Ventricular tachycardia seen on device interrogations  2. Carotid sinus hypersensitivity status post dual-chamber pacemaker implanted in 2008, with recent generator change out in 2024 DAMARI parameters  3. Coronary artery disease with percutaneous coronary interventions in the past and coronary artery bypass graft surgery as described above  4. Normal left ventricular function per echocardiogram as described above  5. Hypertension  6. No significant coronary artery disease per cardiac catheterization as described above  7. Hyperlipidemia  8. History of nonsustained supraventricular tachycardia seen on device interrogations  9. Diabetes mellitus   10. Electrophysiology study with no inducible monomorphic ventricular tachycardia in 2020  11. Covid-19 infection in March 2021. Recovered      Plan-recommendations    We will obtain device interrogations from device clinic at [facility].    Follow my office every 6 months or sooner needed.    Will continue watching the burden of atrial ventricular arrhythmias by  device interrogation.    Risk factor modification and lifestyle modification discussed with patient. Diet , exercise and hydration discussed with patient.    I have personally review with patient during this office visit, laboratory data, echocardiogram results, stress test results, Holter-event monitor results prior and after the last electrophysiology visit. All questions has been answered.    Please excuse any errors in grammar or translation related to this dictation.  Voice recognition software was utilized to prepare this document.

## 2023-12-19 PROCEDURE — 93294 REM INTERROG EVL PM/LDLS PM: CPT | Performed by: INTERNAL MEDICINE

## 2023-12-27 ENCOUNTER — HOSPITAL ENCOUNTER (OUTPATIENT)
Dept: RESPIRATORY THERAPY | Facility: HOSPITAL | Age: 78
Discharge: HOME | End: 2023-12-27
Payer: MEDICARE

## 2023-12-27 DIAGNOSIS — I1A.0 RESISTANT HYPERTENSION: ICD-10-CM

## 2023-12-27 DIAGNOSIS — Z95.0 PACEMAKER: ICD-10-CM

## 2023-12-27 DIAGNOSIS — Z87.891 FORMER SMOKER: ICD-10-CM

## 2023-12-27 DIAGNOSIS — I44.0 FIRST DEGREE AV BLOCK: ICD-10-CM

## 2023-12-27 DIAGNOSIS — I47.29 NSVT (NONSUSTAINED VENTRICULAR TACHYCARDIA) (MULTI): ICD-10-CM

## 2023-12-27 DIAGNOSIS — I25.85 CHRONIC CORONARY MICROVASCULAR DYSFUNCTION: ICD-10-CM

## 2023-12-27 PROCEDURE — 94200 LUNG FUNCTION TEST (MBC/MVV): CPT

## 2024-01-01 LAB
MGC ASCENT PFT - FEV1 - POST: 2
MGC ASCENT PFT - FEV1 - PRE: 1.94
MGC ASCENT PFT - FEV1 - PREDICTED: 2.59
MGC ASCENT PFT - FVC - POST: 2.67
MGC ASCENT PFT - FVC - PRE: 2.62
MGC ASCENT PFT - FVC - PREDICTED: 3.45

## 2024-01-15 ENCOUNTER — LAB (OUTPATIENT)
Dept: LAB | Facility: LAB | Age: 79
End: 2024-01-15
Payer: MEDICARE

## 2024-01-15 DIAGNOSIS — E78.2 MIXED HYPERLIPIDEMIA: ICD-10-CM

## 2024-01-15 DIAGNOSIS — R53.83 OTHER FATIGUE: ICD-10-CM

## 2024-01-15 DIAGNOSIS — E11.65 TYPE 2 DIABETES MELLITUS WITH HYPERGLYCEMIA (MULTI): Primary | ICD-10-CM

## 2024-01-15 DIAGNOSIS — E55.9 VITAMIN D DEFICIENCY, UNSPECIFIED: ICD-10-CM

## 2024-01-15 LAB
25(OH)D3 SERPL-MCNC: 43 NG/ML (ref 30–100)
ALBUMIN SERPL BCP-MCNC: 4.2 G/DL (ref 3.4–5)
ALP SERPL-CCNC: 79 U/L (ref 33–136)
ALT SERPL W P-5'-P-CCNC: 19 U/L (ref 10–52)
ANION GAP SERPL CALC-SCNC: 11 MMOL/L (ref 10–20)
AST SERPL W P-5'-P-CCNC: 16 U/L (ref 9–39)
BILIRUB SERPL-MCNC: 1.4 MG/DL (ref 0–1.2)
BUN SERPL-MCNC: 17 MG/DL (ref 6–23)
CALCIUM SERPL-MCNC: 9.6 MG/DL (ref 8.6–10.3)
CHLORIDE SERPL-SCNC: 105 MMOL/L (ref 98–107)
CHOLEST SERPL-MCNC: 119 MG/DL (ref 0–199)
CHOLESTEROL/HDL RATIO: 2.3
CO2 SERPL-SCNC: 29 MMOL/L (ref 21–32)
CREAT SERPL-MCNC: 1.17 MG/DL (ref 0.5–1.3)
CREAT UR-MCNC: 92.6 MG/DL (ref 20–370)
EGFRCR SERPLBLD CKD-EPI 2021: 65 ML/MIN/1.73M*2
EST. AVERAGE GLUCOSE BLD GHB EST-MCNC: 163 MG/DL
GLUCOSE SERPL-MCNC: 135 MG/DL (ref 74–99)
HBA1C MFR BLD: 7.3 %
HDLC SERPL-MCNC: 52.5 MG/DL
LDLC SERPL CALC-MCNC: 50 MG/DL
MICROALBUMIN UR-MCNC: 14.7 MG/L
MICROALBUMIN/CREAT UR: 15.9 UG/MG CREAT
NON HDL CHOLESTEROL: 67 MG/DL (ref 0–149)
POTASSIUM SERPL-SCNC: 4.8 MMOL/L (ref 3.5–5.3)
PROT SERPL-MCNC: 6.4 G/DL (ref 6.4–8.2)
SODIUM SERPL-SCNC: 140 MMOL/L (ref 136–145)
TRIGL SERPL-MCNC: 81 MG/DL (ref 0–149)
TSH SERPL-ACNC: 2.08 MIU/L (ref 0.44–3.98)
VLDL: 16 MG/DL (ref 0–40)

## 2024-01-15 PROCEDURE — 82306 VITAMIN D 25 HYDROXY: CPT

## 2024-01-15 PROCEDURE — 36415 COLL VENOUS BLD VENIPUNCTURE: CPT

## 2024-01-15 PROCEDURE — 82570 ASSAY OF URINE CREATININE: CPT

## 2024-01-15 PROCEDURE — 84443 ASSAY THYROID STIM HORMONE: CPT

## 2024-01-15 PROCEDURE — 80053 COMPREHEN METABOLIC PANEL: CPT

## 2024-01-15 PROCEDURE — 80061 LIPID PANEL: CPT

## 2024-01-15 PROCEDURE — 83036 HEMOGLOBIN GLYCOSYLATED A1C: CPT

## 2024-01-15 PROCEDURE — 82043 UR ALBUMIN QUANTITATIVE: CPT

## 2024-01-26 ENCOUNTER — TRANSCRIBE ORDERS (OUTPATIENT)
Dept: CARDIOLOGY | Facility: CLINIC | Age: 79
End: 2024-01-26
Payer: MEDICARE

## 2024-01-26 DIAGNOSIS — I25.85 CHRONIC CORONARY MICROVASCULAR DYSFUNCTION: ICD-10-CM

## 2024-01-26 DIAGNOSIS — I65.22 ASYMPTOMATIC STENOSIS OF LEFT CAROTID ARTERY: ICD-10-CM

## 2024-01-26 DIAGNOSIS — I45.10 RIGHT BUNDLE BRANCH BLOCK (RBBB): ICD-10-CM

## 2024-02-19 DIAGNOSIS — E78.5 HYPERLIPIDEMIA, UNSPECIFIED: ICD-10-CM

## 2024-02-19 DIAGNOSIS — I10 ESSENTIAL (PRIMARY) HYPERTENSION: ICD-10-CM

## 2024-02-19 RX ORDER — EZETIMIBE 10 MG/1
10 TABLET ORAL DAILY
Qty: 90 TABLET | Refills: 1 | Status: SHIPPED | OUTPATIENT
Start: 2024-02-19

## 2024-02-19 RX ORDER — LOSARTAN POTASSIUM 25 MG/1
25 TABLET ORAL DAILY
Qty: 90 TABLET | Refills: 1 | Status: SHIPPED | OUTPATIENT
Start: 2024-02-19

## 2024-02-19 RX ORDER — ATORVASTATIN CALCIUM 40 MG/1
40 TABLET, FILM COATED ORAL NIGHTLY
Qty: 90 TABLET | Refills: 1 | Status: SHIPPED | OUTPATIENT
Start: 2024-02-19

## 2024-03-01 ENCOUNTER — CLINICAL SUPPORT (OUTPATIENT)
Dept: CARDIOLOGY | Facility: CLINIC | Age: 79
End: 2024-03-01
Payer: MEDICARE

## 2024-03-01 DIAGNOSIS — I65.22 ASYMPTOMATIC STENOSIS OF LEFT CAROTID ARTERY: ICD-10-CM

## 2024-03-01 DIAGNOSIS — I25.85 CHRONIC CORONARY MICROVASCULAR DYSFUNCTION: ICD-10-CM

## 2024-03-01 DIAGNOSIS — I45.10 RIGHT BUNDLE BRANCH BLOCK (RBBB): ICD-10-CM

## 2024-03-01 LAB
AORTIC VALVE MEAN GRADIENT: 5 MMHG
AORTIC VALVE PEAK VELOCITY: 1.48 M/S
AV PEAK GRADIENT: 8.8 MMHG
AVA (PEAK VEL): 2.93 CM2
AVA (VTI): 2.81 CM2
EJECTION FRACTION APICAL 4 CHAMBER: 41.7
EJECTION FRACTION: 50 %
LEFT VENTRICLE INTERNAL DIMENSION DIASTOLE: 4.66 CM (ref 3.5–6)
LEFT VENTRICULAR OUTFLOW TRACT DIAMETER: 2.2 CM
MITRAL VALVE E/A RATIO: 0.73
MITRAL VALVE E/E' RATIO: 7.1
RIGHT VENTRICLE PEAK SYSTOLIC PRESSURE: 28.4 MMHG

## 2024-03-01 PROCEDURE — 2500000004 HC RX 250 GENERAL PHARMACY W/ HCPCS (ALT 636 FOR OP/ED): Performed by: INTERNAL MEDICINE

## 2024-03-01 PROCEDURE — 93880 EXTRACRANIAL BILAT STUDY: CPT

## 2024-03-01 PROCEDURE — 93306 TTE W/DOPPLER COMPLETE: CPT | Performed by: INTERNAL MEDICINE

## 2024-03-01 PROCEDURE — 93880 EXTRACRANIAL BILAT STUDY: CPT | Performed by: INTERNAL MEDICINE

## 2024-03-01 PROCEDURE — 93306 TTE W/DOPPLER COMPLETE: CPT

## 2024-03-01 RX ADMIN — HUMAN ALBUMIN MICROSPHERES AND PERFLUTREN 0.5 ML: 10; .22 INJECTION, SOLUTION INTRAVENOUS at 09:10

## 2024-03-20 ENCOUNTER — HOSPITAL ENCOUNTER (OUTPATIENT)
Dept: CARDIOLOGY | Age: 79
Discharge: HOME | End: 2024-03-20
Payer: MEDICARE

## 2024-03-20 PROCEDURE — 93296 REM INTERROG EVL PM/IDS: CPT

## 2024-03-21 DIAGNOSIS — Z95.0 PACEMAKER: ICD-10-CM

## 2024-04-18 DIAGNOSIS — I47.29 NSVT (NONSUSTAINED VENTRICULAR TACHYCARDIA) (MULTI): ICD-10-CM

## 2024-04-18 DIAGNOSIS — I10 PRIMARY HYPERTENSION: ICD-10-CM

## 2024-04-18 RX ORDER — METOPROLOL SUCCINATE 100 MG/1
100 TABLET, EXTENDED RELEASE ORAL DAILY
Qty: 90 TABLET | Refills: 0 | Status: SHIPPED | OUTPATIENT
Start: 2024-04-18 | End: 2024-04-23

## 2024-04-23 ENCOUNTER — TELEPHONE (OUTPATIENT)
Dept: CARDIOLOGY | Facility: CLINIC | Age: 79
End: 2024-04-23
Payer: MEDICARE

## 2024-04-23 DIAGNOSIS — I10 PRIMARY HYPERTENSION: ICD-10-CM

## 2024-04-23 DIAGNOSIS — I47.29 NSVT (NONSUSTAINED VENTRICULAR TACHYCARDIA) (MULTI): ICD-10-CM

## 2024-04-23 RX ORDER — METOPROLOL SUCCINATE 100 MG/1
100 TABLET, EXTENDED RELEASE ORAL 2 TIMES DAILY
Qty: 180 TABLET | Refills: 3 | Status: SHIPPED | OUTPATIENT
Start: 2024-04-23

## 2024-04-23 NOTE — TELEPHONE ENCOUNTER
You approved the last Rx but not sure who changed it originally. I fixed it and sent you the new Rx to approve.

## 2024-04-23 NOTE — TELEPHONE ENCOUNTER
Pt received an Rx for Metoprolol Succinate 100mg- take one tablet daily. Pt states he takes this medications twice daily and was unaware of any change to his medication. Pt asking for clarification.

## 2024-06-03 ENCOUNTER — OFFICE VISIT (OUTPATIENT)
Dept: CARDIOLOGY | Facility: CLINIC | Age: 79
End: 2024-06-03
Payer: MEDICARE

## 2024-06-03 VITALS
HEART RATE: 62 BPM | BODY MASS INDEX: 30.54 KG/M2 | DIASTOLIC BLOOD PRESSURE: 62 MMHG | HEIGHT: 67 IN | WEIGHT: 194.6 LBS | SYSTOLIC BLOOD PRESSURE: 128 MMHG

## 2024-06-03 DIAGNOSIS — I25.10 CORONARY ARTERY DISEASE INVOLVING NATIVE CORONARY ARTERY OF NATIVE HEART WITHOUT ANGINA PECTORIS: ICD-10-CM

## 2024-06-03 DIAGNOSIS — Z95.0 PACEMAKER: ICD-10-CM

## 2024-06-03 DIAGNOSIS — I10 PRIMARY HYPERTENSION: ICD-10-CM

## 2024-06-03 DIAGNOSIS — Z87.891 FORMER SMOKER: ICD-10-CM

## 2024-06-03 DIAGNOSIS — I47.29 NSVT (NONSUSTAINED VENTRICULAR TACHYCARDIA) (MULTI): ICD-10-CM

## 2024-06-03 DIAGNOSIS — I44.0 FIRST DEGREE AV BLOCK: Primary | ICD-10-CM

## 2024-06-03 PROCEDURE — 99214 OFFICE O/P EST MOD 30 MIN: CPT | Performed by: INTERNAL MEDICINE

## 2024-06-03 PROCEDURE — 3074F SYST BP LT 130 MM HG: CPT | Performed by: INTERNAL MEDICINE

## 2024-06-03 PROCEDURE — 1036F TOBACCO NON-USER: CPT | Performed by: INTERNAL MEDICINE

## 2024-06-03 PROCEDURE — 3078F DIAST BP <80 MM HG: CPT | Performed by: INTERNAL MEDICINE

## 2024-06-03 PROCEDURE — 1159F MED LIST DOCD IN RCRD: CPT | Performed by: INTERNAL MEDICINE

## 2024-06-03 RX ORDER — ORAL SEMAGLUTIDE 3 MG/1
3 TABLET ORAL DAILY
COMMUNITY
Start: 2024-01-19

## 2024-06-03 NOTE — PATIENT INSTRUCTIONS
Dr. Diallo is referring you to Dr. Duque for General Cardiology    -Please bring all medicines, vitamins, and herbal supplements with you in original bottles to every appointment!!!!    -Prescriptions will not be filled unless you are compliant with your follow up appointments or have a follow up appointment scheduled as per instruction of your physician. Refills should be requested at the time of your visit.     Never

## 2024-06-03 NOTE — PROGRESS NOTES
CARDIOLOGY OFFICE VISIT      CHIEF COMPLAINT  Chief Complaint   Patient presents with    Follow-up       HISTORY OF PRESENT ILLNESS  HPI  78-year-old male with a past medical history of hypertension. He has a history of coronary artery disease with prior myocardial infarction in 2006 with subsequent percutaneous coronary interventions and stenting of the right coronary artery and then subsequent coronary bypass graft surgery October 2006 with a left internal mammary artery to left anterior descending artery, saphenous vein graft to the ramus and right coronary artery. Stress test in 2014 shows no evidence of ischemia with a left ventricular ejection fraction 72%. History of hyperlipidemia. 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 His device has been showing episodes of supraventricular tachycardia with brief episodes of ventricular arrhythmias (nonsustained) for the last few years. Since 2020, the device has been showing episodes of nonsustained ventricular tachycardia with episodes up to 9 seconds of duration. His cardiac data includes a cardiac catheterization performed July 2020 that shows left ventricular ejection fraction 60%. Left main artery normal. Left anterior descending with 100% occluded after moderately large first septal  and first of another branch. Proximal left anterior descending artery has 70% stenosis. LIMA to LAD widely patent. Circumflex nondominant. Proximal vessel less than 10% stenosis. Right coronary artery 100% occluded proximally. SVG to ramus intermedius patent. SVG to right coronary artery widely patent with excellent distal runoff. Medical therapy was recommended.     Echocardiogram performed March 2023 shows left ventricular ejection fraction of 60%. 1-2+ tricuspid regurgitation with mild concentric  ventricular hypertrophy.       Carotid US 3/2024    Imaging & Doppler Findings:  Right Plaque Morph: The proximal right internal carotid artery demonstrates intimal thickening and smooth plaque. The distal right common carotid artery demonstrates intimal thickening and smooth plaque.  Left Plaque Morph: The proximal left internal carotid artery demonstrates smooth, homogenous and calcified plaque. The mid left common carotid artery demonstrates homogenous and calcified plaque.    Since the last office visit, he has been doing well.  He denies any symptoms of chest pain or shortness breath or palpitations.    Patient had a device interrogation in March 2024 that shows battery longevity 9.4 years.  [place] of atrial fibrillation less than 0.1% of the time.  Otherwise device functioning well.        Past Medical History  History reviewed. No pertinent past medical history.    Social History  Social History     Tobacco Use    Smoking status: Former     Types: Cigarettes    Smokeless tobacco: Never   Substance Use Topics    Alcohol use: Yes    Drug use: Not Currently       Family History   No family history on file.     Allergies:  Allergies   Allergen Reactions    Ciprofloxacin Shortness of breath    Tramadol Dizziness and Shortness of breath    Shellfish Containing Products Itching        Outpatient Medications:  Current Outpatient Medications   Medication Instructions    aspirin 81 mg EC tablet 1 tablet, oral, Daily    atorvastatin (LIPITOR) 40 mg, oral, Nightly    cholecalciferol (Vitamin D3) 5,000 Units tablet 1 tablet, oral, Daily    dapagliflozin propanediol (Farxiga) 10 mg 1 tablet, oral, Daily    ezetimibe (ZETIA) 10 mg, oral, Daily    losartan (COZAAR) 25 mg, oral, Daily    magnesium oxide (Mag-Ox) 400 mg (241.3 mg magnesium) tablet 1 tablet, oral, Daily    metFORMIN (Glucophage) 500 mg tablet 1 tablet, oral, 2 times daily    metoprolol succinate XL (TOPROL-XL) 100 mg, oral, 2 times daily    nitroglycerin  (Nitrostat) 0.4 mg SL tablet sublingual    Rybelsus 3 mg, oral, Daily          REVIEW OF SYSTEMS  Review of Systems   All other systems reviewed and are negative.        VITALS  Vitals:    06/03/24 0932   BP: 128/62   Pulse: 62       PHYSICAL EXAM  Constitutional:       Appearance: Healthy appearance. Not in distress.   Neck:      Vascular: No JVR. JVD normal.   Pulmonary:      Effort: Pulmonary effort is normal.      Breath sounds: Normal breath sounds. No wheezing. No rhonchi. No rales.   Chest:      Chest wall: Not tender to palpatation.   Cardiovascular:      PMI at left midclavicular line. Normal rate. Regular rhythm. Normal S1. Normal S2.       Murmurs: There is no murmur.      No gallop.  No click. No rub.      Comments: Device in the left prepectoral healing well.  No signs of hematoma or infection.     Pulses:     Intact distal pulses.   Edema:     Peripheral edema absent.   Abdominal:      General: Bowel sounds are normal.      Palpations: Abdomen is soft.      Tenderness: There is no abdominal tenderness.   Musculoskeletal: Normal range of motion.         General: No tenderness. Skin:     General: Skin is warm and dry.   Neurological:      General: No focal deficit present.      Mental Status: Alert and oriented to person, place and time.           ASSESSMENT AND PLAN    Clinical impression     1. Ventricular tachycardia seen on device interrogations  2. Carotid sinus hypersensitivity status post dual-chamber pacemaker implanted in 2008, with recent generator change out in 2024 DAMARI parameters  3. Coronary artery disease with percutaneous coronary interventions in the past and coronary artery bypass graft surgery as described above  4. Normal left ventricular function per echocardiogram as described above  5. Hypertension  6. No significant coronary artery disease per cardiac catheterization as described above  7. Hyperlipidemia  8. History of nonsustained supraventricular tachycardia seen on device  interrogations  9. Diabetes mellitus   10. Electrophysiology study with no inducible monomorphic ventricular tachycardia in 2020  11. Covid-19 infection in March 2021. Recovered    Plan recommendations    Patient is doing well from the electrophysiology standpoint.  Device functioning well.  [place] of atrial fibrillation are minimal.  Continue with current medical therapy.    Follow device clinic as scheduled.    Follow my office every 6 months or sooner if needed.    Risk factor modification and lifestyle modification discussed with patient. Diet , exercise and hydration discussed with patient.    I have personally review with patient during this office visit, laboratory data, echocardiogram results, stress test results, Holter-event monitor results prior and after the last electrophysiology visit. All questions has been answered.    Please excuse any errors in grammar or translation related to this dictation.  Voice recognition software was utilized to prepare this document.

## 2024-06-20 ENCOUNTER — HOSPITAL ENCOUNTER (OUTPATIENT)
Dept: CARDIOLOGY | Age: 79
Discharge: HOME | End: 2024-06-20
Payer: MEDICARE

## 2024-06-20 PROCEDURE — 93294 REM INTERROG EVL PM/LDLS PM: CPT | Performed by: INTERNAL MEDICINE

## 2024-06-20 PROCEDURE — 93296 REM INTERROG EVL PM/IDS: CPT

## 2024-07-16 DIAGNOSIS — E78.5 HYPERLIPIDEMIA, UNSPECIFIED: ICD-10-CM

## 2024-07-22 ENCOUNTER — APPOINTMENT (OUTPATIENT)
Dept: CARDIOLOGY | Facility: CLINIC | Age: 79
End: 2024-07-22
Payer: MEDICARE

## 2024-07-22 VITALS
DIASTOLIC BLOOD PRESSURE: 84 MMHG | SYSTOLIC BLOOD PRESSURE: 126 MMHG | HEART RATE: 67 BPM | WEIGHT: 190 LBS | BODY MASS INDEX: 29.82 KG/M2 | HEIGHT: 67 IN

## 2024-07-22 DIAGNOSIS — E78.49 OTHER HYPERLIPIDEMIA: ICD-10-CM

## 2024-07-22 DIAGNOSIS — Z95.0 PACEMAKER: ICD-10-CM

## 2024-07-22 DIAGNOSIS — I47.29 NSVT (NONSUSTAINED VENTRICULAR TACHYCARDIA) (MULTI): ICD-10-CM

## 2024-07-22 DIAGNOSIS — Z95.1 S/P CABG X 3: ICD-10-CM

## 2024-07-22 DIAGNOSIS — I10 ESSENTIAL (PRIMARY) HYPERTENSION: ICD-10-CM

## 2024-07-22 DIAGNOSIS — I44.0 FIRST DEGREE AV BLOCK: ICD-10-CM

## 2024-07-22 DIAGNOSIS — I25.10 CORONARY ARTERY DISEASE INVOLVING NATIVE CORONARY ARTERY OF NATIVE HEART WITHOUT ANGINA PECTORIS: ICD-10-CM

## 2024-07-22 DIAGNOSIS — I10 PRIMARY HYPERTENSION: ICD-10-CM

## 2024-07-22 DIAGNOSIS — G90.01: Primary | ICD-10-CM

## 2024-07-22 DIAGNOSIS — E78.5 HYPERLIPIDEMIA, UNSPECIFIED: ICD-10-CM

## 2024-07-22 DIAGNOSIS — Z87.891 FORMER SMOKER: ICD-10-CM

## 2024-07-22 PROCEDURE — 3074F SYST BP LT 130 MM HG: CPT | Performed by: INTERNAL MEDICINE

## 2024-07-22 PROCEDURE — 1159F MED LIST DOCD IN RCRD: CPT | Performed by: INTERNAL MEDICINE

## 2024-07-22 PROCEDURE — 99214 OFFICE O/P EST MOD 30 MIN: CPT | Performed by: INTERNAL MEDICINE

## 2024-07-22 PROCEDURE — 1036F TOBACCO NON-USER: CPT | Performed by: INTERNAL MEDICINE

## 2024-07-22 PROCEDURE — 93000 ELECTROCARDIOGRAM COMPLETE: CPT | Performed by: INTERNAL MEDICINE

## 2024-07-22 PROCEDURE — 3079F DIAST BP 80-89 MM HG: CPT | Performed by: INTERNAL MEDICINE

## 2024-07-22 RX ORDER — METOPROLOL SUCCINATE 100 MG/1
100 TABLET, EXTENDED RELEASE ORAL 2 TIMES DAILY
Qty: 180 TABLET | Refills: 1 | Status: SHIPPED | OUTPATIENT
Start: 2024-07-22

## 2024-07-22 RX ORDER — ATORVASTATIN CALCIUM 40 MG/1
40 TABLET, FILM COATED ORAL NIGHTLY
Qty: 90 TABLET | Refills: 1 | Status: SHIPPED | OUTPATIENT
Start: 2024-07-22

## 2024-07-22 RX ORDER — NITROGLYCERIN 0.4 MG/1
0.4 TABLET SUBLINGUAL EVERY 5 MIN PRN
Qty: 45 TABLET | Refills: 5 | Status: SHIPPED | OUTPATIENT
Start: 2024-07-22

## 2024-07-22 RX ORDER — LOSARTAN POTASSIUM 25 MG/1
25 TABLET ORAL DAILY
Qty: 90 TABLET | Refills: 1 | Status: SHIPPED | OUTPATIENT
Start: 2024-07-22

## 2024-07-22 RX ORDER — EZETIMIBE 10 MG/1
10 TABLET ORAL DAILY
Qty: 90 TABLET | Refills: 1 | Status: SHIPPED | OUTPATIENT
Start: 2024-07-22

## 2024-07-22 ASSESSMENT — ENCOUNTER SYMPTOMS
DEPRESSION: 0
LOSS OF SENSATION IN FEET: 0
OCCASIONAL FEELINGS OF UNSTEADINESS: 0

## 2024-07-22 NOTE — PATIENT INSTRUCTIONS
EPITHELIAL BASEMENT MEMBRANE DYSTROPHY, OU: PRESCRIBE IDANIA. FASTING LABS TO BE DONE 4-5 DAYS PRIOR TO YOUR NEXT VISIT WITH DR STOKES IN 6 MONTHS    Please bring all medicines, vitamins, and herbal supplements with you in original bottles to every appointment! This is the best way to ensure your medication list in your chart is accurate.    Prescriptions will not be filled unless you are compliant with your follow up appointments or have a follow up appointment scheduled as per instruction of your physician. Refills should be requested at the time of your visit.

## 2024-07-22 NOTE — PROGRESS NOTES
Patient:  Moody Tello  YOB: 1945  MRN: [id number]       HPI:       Moody Tello is a 78 y.o. male who returns today for cardiac follow-up.  He was being followed regularly by Dr. Murcia prior to his USP.  He also sees Dr. Diallo.  He has a history of atherosclerotic heart disease with remote myocardial infarction in 2006.  He underwent angioplasty and stenting of the right coronary artery.  He subsequently underwent coronary artery bypass grafting surgery October 2006 (LIMA to LAD, saphenous vein graft to ramus intermedius, and saphenous vein graft to right coronary artery).  Cardiac catheterization by Dr. Avila which July 24, 2020 showed 70% stenosis of the proximal LAD, 100% stenosis of the mid LAD, widely patent LIMA to the LAD, minimal disease of the circumflex, 100% stenosis of the right coronary artery, PTCA venous graft to the ramus intermedius branch and patent saphenous vein graft to the right coronary artery.  LV ejection fraction 55 to 60%.  Echocardiogram March 1, 2024 showed an estimated LV ejection fraction 55 to 60%.  There was mild apical and inferoapical hypokinesis.  Normal valvular structure and function.  Carotid ultrasound March 1, 2024 showed bilateral plaque without any significant stenosis.  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  He had battery change for DAMARI parameters in 2018.  He has been noted on device checks to have brief episodes of SVT and occasional nonsustained VT.  His most recent device check June 20, 2024 showed normal function with battery status 9.2 years.    He has been doing very well since his last visit.  He denies any chest pain or shortness of breath.  He denies any orthopnea, PND, or increasing peripheral edema.  He denies any palpitations, lightheadedness, near-syncope, or syncope.  He denies any fever, chills, or cough.  He denies any nausea,  vomiting, or diaphoresis.  He denies any hemoptysis, hematemesis, melena, or hematochezia. He is currently free of any anginal or CHF symptoms.  His blood pressures are well-controlled.  He will continue on his same medications.  Other details as noted below.    The above portion of this note was dictated by me using voice recognition software.  I personally performed the services described in the documentation.  The scribe entering the documentation below was in my presence.  I affirm that the information is both accurate and complete.      Objective:     Vitals:    07/22/24 1307   BP: 126/84   Pulse: 67       Wt Readings from Last 4 Encounters:   06/03/24 88.3 kg (194 lb 9.6 oz)   11/27/23 90.7 kg (200 lb)   05/22/23 89.4 kg (197 lb)   03/07/23 88 kg (194 lb)       Allergies:     Allergies   Allergen Reactions    Ciprofloxacin Shortness of breath    Tramadol Dizziness and Shortness of breath    Shellfish Containing Products Itching          Medications:     Current Outpatient Medications   Medication Instructions    aspirin 81 mg EC tablet 1 tablet, oral, Daily    atorvastatin (LIPITOR) 40 mg, oral, Nightly    cholecalciferol (Vitamin D3) 5,000 Units tablet 1 tablet, oral, Daily    dapagliflozin propanediol (Farxiga) 10 mg 1 tablet, oral, Daily    ezetimibe (ZETIA) 10 mg, oral, Daily    losartan (COZAAR) 25 mg, oral, Daily    magnesium oxide (Mag-Ox) 400 mg (241.3 mg magnesium) tablet 1 tablet, oral, Daily    metFORMIN (Glucophage) 500 mg tablet 1 tablet, oral, 2 times daily    metoprolol succinate XL (TOPROL-XL) 100 mg, oral, 2 times daily    nitroglycerin (Nitrostat) 0.4 mg SL tablet sublingual    Rybelsus 3 mg, oral, Daily       Physical Examination:   GENERAL:  Well developed, well nourished, in no acute distress.  CHEST:  Symmetric and nontender.  Pacemaker device noted.  NEURO/PSYCH:  Alert and oriented times three with approppriate behavior and responses.  NECK:  Supple, no JVD, no bruit.  LUNGS:  Clear to  auscultation bilaterally, normal respiratory effort.  HEART:  Rate and rhythm regular with no evident murmur, no gallop appreciated.        There are no rubs, clicks or heaves.  EXTREMITIES:  Warm with good color, no clubbing or cyanosis.  There is no edema noted.  PERIPHERAL VASCULAR:  Pulses present and equally palpable; 2+ throughout.      Lab:     CBC:   Lab Results   Component Value Date    WBC 4.6 10/13/2023    RBC 4.13 (L) 10/13/2023    HGB 13.5 10/13/2023    HCT 41.9 10/13/2023     (L) 10/13/2023        CMP:    Lab Results   Component Value Date     01/15/2024    K 4.8 01/15/2024     01/15/2024    CO2 29 01/15/2024    BUN 17 01/15/2024    CREATININE 1.17 01/15/2024    GLUCOSE 135 (H) 01/15/2024    CALCIUM 9.6 01/15/2024       Lipid Profile:    Lab Results   Component Value Date    TRIG 81 01/15/2024    HDL 52.5 01/15/2024    LDLCALC 50 01/15/2024       BMP:  Lab Results   Component Value Date     01/15/2024     07/17/2023     04/10/2023     02/22/2023    K 4.8 01/15/2024    K 4.4 07/17/2023    K 4.8 04/10/2023    K 4.9 02/22/2023     01/15/2024     (H) 07/17/2023     04/10/2023     02/22/2023    CO2 29 01/15/2024    CO2 27 07/17/2023    CO2 28 04/10/2023    CO2 29 02/22/2023    BUN 17 01/15/2024    BUN 15 07/17/2023    BUN 16 04/10/2023    BUN 18 02/22/2023    CREATININE 1.17 01/15/2024    CREATININE 1.21 07/17/2023    CREATININE 1.06 04/10/2023    CREATININE 1.04 02/22/2023       CBC:  Lab Results   Component Value Date    WBC 4.6 10/13/2023    WBC 4.9 04/10/2023    WBC 4.3 (L) 02/16/2022    WBC 3.7 (L) 03/17/2021    RBC 4.13 (L) 10/13/2023    RBC 4.19 (L) 04/10/2023    RBC 4.09 (L) 02/16/2022    RBC 3.48 (L) 03/17/2021    HGB 13.5 10/13/2023    HGB 13.6 04/10/2023    HGB 13.4 (L) 02/16/2022    HGB 11.2 (L) 03/17/2021    HCT 41.9 10/13/2023    HCT 42.0 04/10/2023    HCT 41.4 02/16/2022    HCT 34.1 (L) 03/17/2021     (H) 10/13/2023      04/10/2023     (H) 02/16/2022    MCV 98 03/17/2021    MCH 32.7 10/13/2023    MCHC 32.2 10/13/2023    MCHC 32.4 04/10/2023    MCHC 32.4 02/16/2022    MCHC 32.8 03/17/2021    RDW 13.4 10/13/2023    RDW 13.2 04/10/2023    RDW 12.7 02/16/2022    RDW 12.3 03/17/2021     (L) 10/13/2023     (L) 04/10/2023     02/16/2022     03/17/2021    MPV 10.2 10/13/2023       Hepatic Function Panel:    Lab Results   Component Value Date    ALKPHOS 79 01/15/2024    ALT 19 01/15/2024    AST 16 01/15/2024    PROT 6.4 01/15/2024    BILITOT 1.4 (H) 01/15/2024    BILIDIR 0.2 03/17/2021       HgBA1c:    Lab Results   Component Value Date    HGBA1C 7.3 (H) 01/15/2024       Magnesium:    Lab Results   Component Value Date    MG 2.45 (H) 02/22/2023       TSH:    Lab Results   Component Value Date    TSH 2.08 01/15/2024       BNP:   Lab Results   Component Value Date    BNP 37 03/13/2021        PT/INR:    Lab Results   Component Value Date    PROTIME 12.3 03/13/2021    INR 1.1 03/13/2021       Diagnostic Studies:     Pulmonary function test  Result Date: 1/1/2024    The FEV1/FVC is normal. The FEV1 is normal. The FVC is normal. The TLC by body plethysmography is reduced, suggesting a mild restrictive defect. The DLCO is mildly reduced; however, the diffusing capacity was not corrected for the patient's hemoglobin. The airways resistance is normal. Following administration of bronchodilators, there is no significant response. Conclusion: Normal spirometry. Lung volumes by plethysmography indicate a restrictive ventilatory impairment. DLCO values are consistent with loss of alveoli capillary structure with loss of lung volume ? e.g., emphysema or ILD.      Problem List:     Patient Active Problem List   Diagnosis    Aortic valve insufficiency, acquired    Asymptomatic stenosis of left carotid artery    CAD (coronary artery disease)    Carotid sinus syndrome with bradycardia    Diabetes mellitus (Multi)     First degree AV block    History of carotid artery stenosis    Hyperlipidemia    Hypertension    NSVT (nonsustained ventricular tachycardia) (Multi)    Presence of stent in artery    Right bundle branch block (RBBB)    S/P CABG x 3       Asessment:     Problem List Items Addressed This Visit             ICD-10-CM    CAD (coronary artery disease) I25.10    Relevant Medications    metoprolol succinate XL (Toprol-XL) 100 mg 24 hr tablet    nitroglycerin (Nitrostat) 0.4 mg SL tablet    Other Relevant Orders    Follow Up In Cardiology    Comprehensive Metabolic Panel    CBC    Lipid Panel    Carotid sinus syndrome with bradycardia - Primary G90.01    First degree AV block I44.0    Hyperlipidemia E78.5    Hypertension I10    Relevant Medications    metoprolol succinate XL (Toprol-XL) 100 mg 24 hr tablet    NSVT (nonsustained ventricular tachycardia) (Multi) I47.29    Relevant Medications    metoprolol succinate XL (Toprol-XL) 100 mg 24 hr tablet    nitroglycerin (Nitrostat) 0.4 mg SL tablet    S/P CABG x 3 Z95.1     Other Visit Diagnoses         Codes    Former smoker     Z87.891    Pacemaker     Z95.0    Relevant Orders    ECG 12 Lead    BMI 30.0-30.9,adult     Z68.30    Hyperlipidemia, unspecified     E78.5    Relevant Medications    atorvastatin (Lipitor) 40 mg tablet    ezetimibe (Zetia) 10 mg tablet    Other Relevant Orders    Lipid Panel    Essential (primary) hypertension     I10    Relevant Medications    losartan (Cozaar) 25 mg tablet

## 2024-07-27 RX ORDER — ATORVASTATIN CALCIUM 40 MG/1
40 TABLET, FILM COATED ORAL NIGHTLY
Qty: 90 TABLET | Refills: 1 | Status: SHIPPED | OUTPATIENT
Start: 2024-07-27

## 2024-10-03 ENCOUNTER — HOSPITAL ENCOUNTER (OUTPATIENT)
Dept: CARDIOLOGY | Age: 79
Discharge: HOME | End: 2024-10-03
Payer: MEDICARE

## 2024-10-03 PROCEDURE — 93296 REM INTERROG EVL PM/IDS: CPT

## 2024-10-03 PROCEDURE — 93294 REM INTERROG EVL PM/LDLS PM: CPT | Performed by: INTERNAL MEDICINE

## 2024-11-21 DIAGNOSIS — E78.2 MIXED HYPERLIPIDEMIA: ICD-10-CM

## 2024-11-21 DIAGNOSIS — E11.65 TYPE 2 DIABETES MELLITUS WITH HYPERGLYCEMIA (MULTI): Primary | ICD-10-CM

## 2024-11-21 DIAGNOSIS — E55.9 VITAMIN D DEFICIENCY, UNSPECIFIED: ICD-10-CM

## 2024-11-21 DIAGNOSIS — E03.9 HYPOTHYROIDISM, UNSPECIFIED: ICD-10-CM

## 2024-12-02 ENCOUNTER — APPOINTMENT (OUTPATIENT)
Dept: CARDIOLOGY | Facility: CLINIC | Age: 79
End: 2024-12-02
Payer: MEDICARE

## 2024-12-02 ENCOUNTER — TRANSCRIBE ORDERS (OUTPATIENT)
Dept: CARDIOLOGY | Facility: CLINIC | Age: 79
End: 2024-12-02

## 2024-12-02 DIAGNOSIS — Z95.0 PACEMAKER: ICD-10-CM

## 2024-12-02 DIAGNOSIS — I44.0 FIRST DEGREE AV BLOCK: ICD-10-CM

## 2024-12-02 DIAGNOSIS — I47.29 NSVT (NONSUSTAINED VENTRICULAR TACHYCARDIA) (MULTI): ICD-10-CM

## 2024-12-02 DIAGNOSIS — I25.10 CORONARY ARTERY DISEASE INVOLVING NATIVE CORONARY ARTERY OF NATIVE HEART WITHOUT ANGINA PECTORIS: ICD-10-CM

## 2024-12-02 DIAGNOSIS — I10 PRIMARY HYPERTENSION: ICD-10-CM

## 2024-12-02 DIAGNOSIS — Z87.891 FORMER SMOKER: ICD-10-CM

## 2024-12-02 PROCEDURE — 1036F TOBACCO NON-USER: CPT | Performed by: INTERNAL MEDICINE

## 2024-12-02 PROCEDURE — 1159F MED LIST DOCD IN RCRD: CPT | Performed by: INTERNAL MEDICINE

## 2024-12-02 PROCEDURE — 99443 PR PHYS/QHP TELEPHONE EVALUATION 21-30 MIN: CPT | Performed by: INTERNAL MEDICINE

## 2024-12-02 RX ORDER — METFORMIN HYDROCHLORIDE 500 MG/1
1 TABLET ORAL
COMMUNITY
Start: 2024-11-12

## 2024-12-02 RX ORDER — ORAL SEMAGLUTIDE 3 MG/1
1 TABLET ORAL
COMMUNITY
Start: 2024-08-19

## 2024-12-02 NOTE — PROGRESS NOTES
CARDIOLOGY OFFICE VISIT      CHIEF COMPLAINT  Chief Complaint   Patient presents with    Follow-up       HISTORY OF PRESENT ILLNESS  HPI  79-year-old male with a past medical history of hypertension. He has a history of coronary artery disease with prior myocardial infarction in 2006 with subsequent percutaneous coronary interventions and stenting of the right coronary artery and then subsequent coronary bypass graft surgery October 2006 with a left internal mammary artery to left anterior descending artery, saphenous vein graft to the ramus and right coronary artery. Stress test in 2014 shows no evidence of ischemia with a left ventricular ejection fraction 72%. History of hyperlipidemia. 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 His device has been showing episodes of supraventricular tachycardia with brief episodes of ventricular arrhythmias (nonsustained) for the last few years. Since 2020, the device has been showing episodes of nonsustained ventricular tachycardia with episodes up to 9 seconds of duration. His cardiac data includes a cardiac catheterization performed July 2020 that shows left ventricular ejection fraction 60%. Left main artery normal. Left anterior descending with 100% occluded after moderately large first septal  and first of another branch. Proximal left anterior descending artery has 70% stenosis. LIMA to LAD widely patent. Circumflex nondominant. Proximal vessel less than 10% stenosis. Right coronary artery 100% occluded proximally. SVG to ramus intermedius patent. SVG to right coronary artery widely patent with excellent distal runoff. Medical therapy was recommended.      Echocardiogram performed March 2023 shows left ventricular ejection fraction of 60%. 1-2+ tricuspid regurgitation with mild concentric  ventricular hypertrophy.         Carotid US 3/2024     Imaging & Doppler Findings:  Right Plaque Morph: The proximal right internal carotid artery demonstrates intimal thickening and smooth plaque. The distal right common carotid artery demonstrates intimal thickening and smooth plaque.  Left Plaque Morph: The proximal left internal carotid artery demonstrates smooth, homogenous and calcified plaque. The mid left common carotid artery demonstrates homogenous and calcified plaque.    Since the last office visit, he has been doing well.  He denies any symptoms of chest pain or shortness breath or palpitations.    Device interrogation in October 3, 2024 by the device clinic shows dual-chamber pacemaker Medtronic with battery longevity 9 years.  No evidence of atrial or ventricular arrhythmias.        Past Medical History  History reviewed. No pertinent past medical history.    Social History  Social History     Tobacco Use    Smoking status: Former     Types: Cigarettes    Smokeless tobacco: Never   Substance Use Topics    Alcohol use: Yes    Drug use: Not Currently       Family History   No family history on file.     Allergies:  Allergies   Allergen Reactions    Ciprofloxacin Shortness of breath    Tramadol Dizziness and Shortness of breath    Shellfish Containing Products Itching        Outpatient Medications:  Current Outpatient Medications   Medication Instructions    aspirin 81 mg EC tablet 1 tablet, Daily    atorvastatin (LIPITOR) 40 mg, oral, Nightly    atorvastatin (LIPITOR) 40 mg, oral, Nightly    cholecalciferol (Vitamin D3) 5,000 Units tablet 1 tablet, Daily    dapagliflozin propanediol (Farxiga) 10 mg 1 tablet, Daily    ezetimibe (ZETIA) 10 mg, oral, Daily    losartan (COZAAR) 25 mg, oral, Daily    magnesium oxide (Mag-Ox) 400 mg (241.3 mg magnesium) tablet 1 tablet, Daily    metFORMIN (Glucophage) 500 mg tablet 1 tablet, Every 12 hours scheduled (0630,1830)    metoprolol succinate XL (TOPROL-XL) 100 mg,  oral, 2 times daily    nitroglycerin (Nitrostat) 0.4 mg SL tablet PLACE 1 TABLET UNDER THE TONGUE EVERY 5 MINUTES AS NEEDED FOR CHEST PAIN    Rybelsus 3 mg tablet 1 tablet, Daily (0630)          REVIEW OF SYSTEMS  Review of Systems   All other systems reviewed and are negative.        VITALS  There were no vitals filed for this visit.    PHYSICAL EXAM  Physical Exam  Alert oriented x 3  No motor or sensory resting  No edema in the lower extremity    ASSESSMENT AND PLAN  Clinical impression     1. Ventricular tachycardia seen on device interrogations  2. Carotid sinus hypersensitivity status post dual-chamber pacemaker implanted in 2008, with recent generator change out in 2024 DAMARI parameters  3. Coronary artery disease with percutaneous coronary interventions in the past and coronary artery bypass graft surgery as described above  4. Normal left ventricular function per echocardiogram as described above  5. Hypertension  6. No significant coronary artery disease per cardiac catheterization as described above  7. Hyperlipidemia  8. History of nonsustained supraventricular tachycardia seen on device interrogations  9. Diabetes mellitus   10. Electrophysiology study with no inducible monomorphic ventricular tachycardia in 2020  11. Covid-19 infection in March 2021. Recovered    Plan-recommendations    Patient is doing well from the electrophysiology standpoint.  Will continue with current medical therapy.    Follow device clinic as scheduled.    Follow my office every 6 months or sooner if needed.    Risk factor modification and lifestyle modification discussed with patient. Diet , exercise and hydration discussed with patient.  '    I have personally review with patient during this office visit, laboratory data, echocardiogram results, stress test results, Holter-event monitor results prior and after the last electrophysiology visit. All questions has been answered.    Please excuse any errors in grammar or  translation related to this dictation.  Voice recognition software was utilized to prepare this document.

## 2025-01-10 ENCOUNTER — HOSPITAL ENCOUNTER (OUTPATIENT)
Dept: CARDIOLOGY | Age: 80
Discharge: HOME | End: 2025-01-10
Payer: MEDICARE

## 2025-01-10 PROCEDURE — 93280 PM DEVICE PROGR EVAL DUAL: CPT | Performed by: INTERNAL MEDICINE

## 2025-01-10 PROCEDURE — 93280 PM DEVICE PROGR EVAL DUAL: CPT

## 2025-01-16 DIAGNOSIS — I10 ESSENTIAL (PRIMARY) HYPERTENSION: ICD-10-CM

## 2025-01-16 DIAGNOSIS — E78.5 HYPERLIPIDEMIA, UNSPECIFIED: ICD-10-CM

## 2025-01-16 RX ORDER — EZETIMIBE 10 MG/1
10 TABLET ORAL DAILY
Qty: 1 TABLET | Refills: 0 | OUTPATIENT
Start: 2025-01-16

## 2025-01-16 RX ORDER — LOSARTAN POTASSIUM 25 MG/1
25 TABLET ORAL DAILY
Qty: 1 TABLET | Refills: 0 | OUTPATIENT
Start: 2025-01-16

## 2025-01-16 NOTE — TELEPHONE ENCOUNTER
Bernadette,CNP please deny rx for Losartan  Pt has appt 1/20/25 and refills can be sent at that time.     Left vmm for patent to call office back if he does not have enough pills to get through till appt.

## 2025-01-16 NOTE — TELEPHONE ENCOUNTER
Bernadette,CNP please deny rx for Zetia.  Pt has appt 1/20/25 and refills can be sent at that time.     Left vmm for patent to call office back if he does not have enough pills to get through till appt.

## 2025-01-20 ENCOUNTER — APPOINTMENT (OUTPATIENT)
Dept: CARDIOLOGY | Facility: CLINIC | Age: 80
End: 2025-01-20
Payer: MEDICARE

## 2025-01-20 VITALS
HEART RATE: 75 BPM | HEIGHT: 67 IN | SYSTOLIC BLOOD PRESSURE: 118 MMHG | DIASTOLIC BLOOD PRESSURE: 68 MMHG | BODY MASS INDEX: 30.76 KG/M2 | WEIGHT: 196 LBS

## 2025-01-20 DIAGNOSIS — I47.29 NSVT (NONSUSTAINED VENTRICULAR TACHYCARDIA) (MULTI): ICD-10-CM

## 2025-01-20 DIAGNOSIS — I10 PRIMARY HYPERTENSION: ICD-10-CM

## 2025-01-20 DIAGNOSIS — E78.5 HYPERLIPIDEMIA, UNSPECIFIED: ICD-10-CM

## 2025-01-20 DIAGNOSIS — I25.10 CORONARY ARTERY DISEASE INVOLVING NATIVE CORONARY ARTERY OF NATIVE HEART WITHOUT ANGINA PECTORIS: ICD-10-CM

## 2025-01-20 PROCEDURE — 99214 OFFICE O/P EST MOD 30 MIN: CPT | Performed by: INTERNAL MEDICINE

## 2025-01-20 PROCEDURE — 1036F TOBACCO NON-USER: CPT | Performed by: INTERNAL MEDICINE

## 2025-01-20 PROCEDURE — 3078F DIAST BP <80 MM HG: CPT | Performed by: INTERNAL MEDICINE

## 2025-01-20 PROCEDURE — 3074F SYST BP LT 130 MM HG: CPT | Performed by: INTERNAL MEDICINE

## 2025-01-20 PROCEDURE — 1159F MED LIST DOCD IN RCRD: CPT | Performed by: INTERNAL MEDICINE

## 2025-01-20 RX ORDER — ATORVASTATIN CALCIUM 40 MG/1
40 TABLET, FILM COATED ORAL NIGHTLY
Qty: 90 TABLET | Refills: 3 | Status: SHIPPED | OUTPATIENT
Start: 2025-01-20

## 2025-01-20 RX ORDER — EZETIMIBE 10 MG/1
10 TABLET ORAL DAILY
Qty: 90 TABLET | Refills: 3 | Status: SHIPPED | OUTPATIENT
Start: 2025-01-20

## 2025-01-20 RX ORDER — METOPROLOL SUCCINATE 100 MG/1
100 TABLET, EXTENDED RELEASE ORAL 2 TIMES DAILY
Qty: 180 TABLET | Refills: 3 | Status: SHIPPED | OUTPATIENT
Start: 2025-01-20

## 2025-01-20 NOTE — PATIENT INSTRUCTIONS
FASTING LABS, FASTING FROM MIDNIGHT THE NIGHT BEFORE TO BE DONE WITHIN 2 WEEKS FROM 1/20/25 AND AGAIN 1 WEEK PRIOR TO YOUR APPOINTMENT WITH DR STOKES IN 6 MONTHS    DID YOU KNOW  We have a pharmacy here in the [facility].  They can fill all prescriptions, not just cardiac medications.  Prescriptions from other pharmacies can easily be transferred to the  pharmacy by the  pharmacist on site.   pharmacies offer FREE HOME DELIVERY on medications to anywhere in [place]. They can sync your medications. Typically prescriptions can be ready in 10 - 15 minutes. If pharmacy is unable to fill your  prescription or if cost is more than your paying now the Pharmacist can easily transfer back to your Pharmacy of choice. Pharmacy phone # [phone].     Please bring all medicines, vitamins, and herbal supplements with you in original bottles to every appointment! This is the best way to ensure your medication list in your chart is accurate.    Prescriptions will not be filled unless you are compliant with your follow up appointments or have a follow up appointment scheduled as per instruction of your physician. Refills should be requested at the time of your visit.

## 2025-01-20 NOTE — PROGRESS NOTES
Patient:  Moody Tello  YOB: 1945  MRN: [id number]       HPI:       Moody Tello is a 79 y.o. male who returns today for cardiac follow-up.  He was being followed regularly by Dr. Murcia prior to his senior care.  He also sees Dr. Diallo.  He has a history of atherosclerotic heart disease with remote myocardial infarction in 2006.  He underwent angioplasty and stenting of the right coronary artery.  He subsequently underwent coronary artery bypass grafting surgery October 2006 (LIMA to LAD, saphenous vein graft to ramus intermedius, and saphenous vein graft to right coronary artery).  Cardiac catheterization by Dr. Avila July 24, 2020 showed 70% stenosis of the proximal LAD, 100% stenosis of the mid LAD, widely patent LIMA to the LAD, minimal disease of the circumflex, 100% stenosis of the right coronary artery, patent saphenous venous graft to the ramus intermedius branch and patent saphenous vein graft to the right coronary artery.  LV ejection fraction 55 to 60%.  Echocardiogram March 1, 2024 showed an estimated LV ejection fraction 55 to 60%.  There was mild apical and inferoapical hypokinesis.  Normal valvular structure and function.  Carotid ultrasound March 1, 2024 showed bilateral plaque without any significant stenosis.  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  He had battery change for DAMARI parameters in 2018.  He has been noted on device checks to have brief episodes of SVT and occasional nonsustained VT.   Device interrogation on October 3, 2024 by the device clinic shows dual-chamber pacemaker Medtronic with battery longevity 9 years. No evidence of atrial or ventricular arrhythmias.      He continues to do well.  He denies any chest pain or shortness of breath.  He denies any orthopnea, PND, or increasing peripheral edema.  He denies any palpitations, lightheadedness, near-syncope, or  syncope.  He denies any fever, chills, or cough.  Lab studies January 15, 2024 showed a cholesterol 119 with HDL 52, LDL 50, triglycerides 81, TSH was normal.  CBC was normal with exception of RBC 4.3.  Comprehensive metabolic profile was normal with exception of glucose 135 and total bilirubin 1.4.He denies any nausea, vomiting, or diaphoresis.  He denies any hemoptysis, hematemesis, melena, or hematochezia. He is currently free of any anginal or CHF symptoms.  His blood pressures are well-controlled.  Continue ASA, atorvastatin, Farxiaga, losartan and Toprol XL.  Other details as noted below.     The above portion of this note was dictated by me using voice recognition software.  I personally performed the services described in the documentation.  The scribe entering the documentation below was in my presence.  I affirm that the information is both accurate and complete.      Objective:     Vitals:    01/20/25 1343   BP: 118/68   Pulse: 75       Wt Readings from Last 4 Encounters:   01/20/25 88.9 kg (196 lb)   07/22/24 86.2 kg (190 lb)   06/03/24 88.3 kg (194 lb 9.6 oz)   11/27/23 90.7 kg (200 lb)       Allergies:     Allergies   Allergen Reactions    Ciprofloxacin Shortness of breath    Tramadol Dizziness and Shortness of breath    Shellfish Containing Products Itching        Medications:     Current Outpatient Medications   Medication Instructions    aspirin 81 mg EC tablet 1 tablet, Daily    atorvastatin (LIPITOR) 40 mg, oral, Nightly    atorvastatin (LIPITOR) 40 mg, oral, Nightly    cholecalciferol (Vitamin D3) 5,000 Units tablet 1 tablet, Daily    dapagliflozin propanediol (Farxiga) 10 mg 1 tablet, Daily    ezetimibe (ZETIA) 10 mg, oral, Daily    losartan (COZAAR) 25 mg, oral, Daily    magnesium oxide (Mag-Ox) 400 mg (241.3 mg magnesium) tablet 1 tablet, Daily    metFORMIN (Glucophage) 500 mg tablet 1 tablet, Every 12 hours scheduled (0630,1830)    metoprolol succinate XL (TOPROL-XL) 100 mg, oral, 2 times  daily    nitroglycerin (Nitrostat) 0.4 mg SL tablet PLACE 1 TABLET UNDER THE TONGUE EVERY 5 MINUTES AS NEEDED FOR CHEST PAIN    Rybelsus 3 mg tablet 1 tablet, Daily (0630)       Physical Examination:   GENERAL:  Well developed, well nourished, in no acute distress.  CHEST:  Symmetric and nontender.  NEURO/PSYCH:  Alert and oriented times three with approppriate behavior and responses.  NECK:  Supple, no JVD, no bruit.  LUNGS:  Clear to auscultation bilaterally, normal respiratory effort.  HEART:  Rate and rhythm regular with no evident murmur, no gallop appreciated.        There are no rubs, clicks or heaves.  EXTREMITIES:  Warm with good color, no clubbing or cyanosis.  There is no edema noted.  PERIPHERAL VASCULAR:  Pulses present and equally palpable; 2+ throughout.      Lab:     CBC:   Lab Results   Component Value Date    WBC 4.6 10/13/2023    RBC 4.13 (L) 10/13/2023    HGB 13.5 10/13/2023    HCT 41.9 10/13/2023     (L) 10/13/2023        CMP:    Lab Results   Component Value Date     01/15/2024    K 4.8 01/15/2024     01/15/2024    CO2 29 01/15/2024    BUN 17 01/15/2024    CREATININE 1.17 01/15/2024    GLUCOSE 135 (H) 01/15/2024    CALCIUM 9.6 01/15/2024       Lipid Profile:    Lab Results   Component Value Date    TRIG 81 01/15/2024    HDL 52.5 01/15/2024    LDLCALC 50 01/15/2024       BMP:  Lab Results   Component Value Date     01/15/2024     07/17/2023     04/10/2023     02/22/2023    K 4.8 01/15/2024    K 4.4 07/17/2023    K 4.8 04/10/2023    K 4.9 02/22/2023     01/15/2024     (H) 07/17/2023     04/10/2023     02/22/2023    CO2 29 01/15/2024    CO2 27 07/17/2023    CO2 28 04/10/2023    CO2 29 02/22/2023    BUN 17 01/15/2024    BUN 15 07/17/2023    BUN 16 04/10/2023    BUN 18 02/22/2023    CREATININE 1.17 01/15/2024    CREATININE 1.21 07/17/2023    CREATININE 1.06 04/10/2023    CREATININE 1.04 02/22/2023       CBC:  Lab Results   Component  Value Date    WBC 4.6 10/13/2023    WBC 4.9 04/10/2023    WBC 4.3 (L) 02/16/2022    WBC 3.7 (L) 03/17/2021    RBC 4.13 (L) 10/13/2023    RBC 4.19 (L) 04/10/2023    RBC 4.09 (L) 02/16/2022    RBC 3.48 (L) 03/17/2021    HGB 13.5 10/13/2023    HGB 13.6 04/10/2023    HGB 13.4 (L) 02/16/2022    HGB 11.2 (L) 03/17/2021    HCT 41.9 10/13/2023    HCT 42.0 04/10/2023    HCT 41.4 02/16/2022    HCT 34.1 (L) 03/17/2021     (H) 10/13/2023     04/10/2023     (H) 02/16/2022    MCV 98 03/17/2021    MCH 32.7 10/13/2023    MCHC 32.2 10/13/2023    MCHC 32.4 04/10/2023    MCHC 32.4 02/16/2022    MCHC 32.8 03/17/2021    RDW 13.4 10/13/2023    RDW 13.2 04/10/2023    RDW 12.7 02/16/2022    RDW 12.3 03/17/2021     (L) 10/13/2023     (L) 04/10/2023     02/16/2022     03/17/2021    MPV 10.2 10/13/2023       Hepatic Function Panel:    Lab Results   Component Value Date    ALKPHOS 79 01/15/2024    ALT 19 01/15/2024    AST 16 01/15/2024    PROT 6.4 01/15/2024    BILITOT 1.4 (H) 01/15/2024    BILIDIR 0.2 03/17/2021       HgBA1c:    Lab Results   Component Value Date    HGBA1C 7.3 (H) 01/15/2024       Magnesium:    Lab Results   Component Value Date    MG 2.45 (H) 02/22/2023       TSH:    Lab Results   Component Value Date    TSH 2.08 01/15/2024       BNP:   Lab Results   Component Value Date    BNP 37 03/13/2021        PT/INR:    Lab Results   Component Value Date    PROTIME 12.3 03/13/2021    INR 1.1 03/13/2021       Diagnostic Studies:     Pulmonary function test    Result Date: 1/1/2024  The FEV1/FVC is normal. The FEV1 is normal. The FVC is normal. The TLC by body plethysmography is reduced, suggesting a mild restrictive defect. The DLCO is mildly reduced; however, the diffusing capacity was not corrected for the patient's hemoglobin. The airways resistance is normal. Following administration of bronchodilators, there is no significant response. Conclusion: Normal spirometry. Lung volumes by  plethysmography indicate a restrictive ventilatory impairment. DLCO values are consistent with loss of alveoli capillary structure with loss of lung volume ? e.g., emphysema or ILD.      Problem List:     Patient Active Problem List   Diagnosis    Aortic valve insufficiency, acquired    Asymptomatic stenosis of left carotid artery    CAD (coronary artery disease)    Carotid sinus syndrome with bradycardia    Diabetes mellitus (Multi)    First degree AV block    History of carotid artery stenosis    Hyperlipidemia    Hypertension    NSVT (nonsustained ventricular tachycardia) (Multi)    Presence of stent in artery    Right bundle branch block (RBBB)    S/P CABG x 3       Asessment:     Problem List Items Addressed This Visit             ICD-10-CM    CAD (coronary artery disease) I25.10    Relevant Medications    metoprolol succinate XL (Toprol-XL) 100 mg 24 hr tablet    Other Relevant Orders    Follow Up In Cardiology    Lipid Panel    Comprehensive Metabolic Panel    CBC    Lipid Panel    Hypertension I10    Relevant Medications    metoprolol succinate XL (Toprol-XL) 100 mg 24 hr tablet    NSVT (nonsustained ventricular tachycardia) (Multi) I47.29    Relevant Medications    metoprolol succinate XL (Toprol-XL) 100 mg 24 hr tablet     Other Visit Diagnoses         Codes    Hyperlipidemia, unspecified     E78.5    Relevant Medications    atorvastatin (Lipitor) 40 mg tablet    ezetimibe (Zetia) 10 mg tablet

## 2025-01-31 LAB
ALBUMIN SERPL-MCNC: 4.2 G/DL (ref 3.6–5.1)
ALP SERPL-CCNC: 92 U/L (ref 35–144)
ALT SERPL-CCNC: 21 U/L (ref 9–46)
ANION GAP SERPL CALCULATED.4IONS-SCNC: 8 MMOL/L (CALC) (ref 7–17)
AST SERPL-CCNC: 19 U/L (ref 10–35)
BILIRUB SERPL-MCNC: 1.4 MG/DL (ref 0.2–1.2)
BUN SERPL-MCNC: 18 MG/DL (ref 7–25)
CALCIUM SERPL-MCNC: 8.9 MG/DL (ref 8.6–10.3)
CHLORIDE SERPL-SCNC: 107 MMOL/L (ref 98–110)
CHOLEST SERPL-MCNC: 110 MG/DL
CHOLEST/HDLC SERPL: 2.2 (CALC)
CO2 SERPL-SCNC: 28 MMOL/L (ref 20–32)
CREAT SERPL-MCNC: 1.06 MG/DL (ref 0.7–1.28)
EGFRCR SERPLBLD CKD-EPI 2021: 72 ML/MIN/1.73M2
ERYTHROCYTE [DISTWIDTH] IN BLOOD BY AUTOMATED COUNT: 12.5 % (ref 11–15)
GLUCOSE SERPL-MCNC: 115 MG/DL (ref 65–99)
HCT VFR BLD AUTO: 40.3 % (ref 38.5–50)
HDLC SERPL-MCNC: 50 MG/DL
HGB BLD-MCNC: 13.2 G/DL (ref 13.2–17.1)
LDLC SERPL CALC-MCNC: 43 MG/DL (CALC)
MCH RBC QN AUTO: 32.4 PG (ref 27–33)
MCHC RBC AUTO-ENTMCNC: 32.8 G/DL (ref 32–36)
MCV RBC AUTO: 99 FL (ref 80–100)
NONHDLC SERPL-MCNC: 60 MG/DL (CALC)
PLATELET # BLD AUTO: 136 THOUSAND/UL (ref 140–400)
PMV BLD REES-ECKER: 10.5 FL (ref 7.5–12.5)
POTASSIUM SERPL-SCNC: 5.1 MMOL/L (ref 3.5–5.3)
PROT SERPL-MCNC: 6.4 G/DL (ref 6.1–8.1)
RBC # BLD AUTO: 4.07 MILLION/UL (ref 4.2–5.8)
SODIUM SERPL-SCNC: 143 MMOL/L (ref 135–146)
TRIGL SERPL-MCNC: 90 MG/DL
WBC # BLD AUTO: 3.8 THOUSAND/UL (ref 3.8–10.8)

## 2025-04-25 ENCOUNTER — HOSPITAL ENCOUNTER (OUTPATIENT)
Dept: CARDIOLOGY | Age: 80
Discharge: HOME | End: 2025-04-25
Payer: MEDICARE

## 2025-04-25 PROCEDURE — 93296 REM INTERROG EVL PM/IDS: CPT

## 2025-04-25 PROCEDURE — 93294 REM INTERROG EVL PM/LDLS PM: CPT | Performed by: INTERNAL MEDICINE

## 2025-06-02 ENCOUNTER — APPOINTMENT (OUTPATIENT)
Dept: CARDIOLOGY | Facility: CLINIC | Age: 80
End: 2025-06-02
Payer: MEDICARE

## 2025-06-02 VITALS
DIASTOLIC BLOOD PRESSURE: 78 MMHG | BODY MASS INDEX: 30.61 KG/M2 | HEIGHT: 67 IN | SYSTOLIC BLOOD PRESSURE: 116 MMHG | HEART RATE: 76 BPM | WEIGHT: 195 LBS

## 2025-06-02 DIAGNOSIS — I47.29 NSVT (NONSUSTAINED VENTRICULAR TACHYCARDIA) (MULTI): ICD-10-CM

## 2025-06-02 DIAGNOSIS — I10 PRIMARY HYPERTENSION: ICD-10-CM

## 2025-06-02 DIAGNOSIS — I44.0 FIRST DEGREE AV BLOCK: Primary | ICD-10-CM

## 2025-06-02 DIAGNOSIS — Z87.891 FORMER SMOKER: ICD-10-CM

## 2025-06-02 DIAGNOSIS — Z95.0 PACEMAKER: ICD-10-CM

## 2025-06-02 PROCEDURE — 99214 OFFICE O/P EST MOD 30 MIN: CPT | Performed by: INTERNAL MEDICINE

## 2025-06-02 PROCEDURE — 1036F TOBACCO NON-USER: CPT | Performed by: INTERNAL MEDICINE

## 2025-06-02 PROCEDURE — 1159F MED LIST DOCD IN RCRD: CPT | Performed by: INTERNAL MEDICINE

## 2025-06-02 PROCEDURE — 3074F SYST BP LT 130 MM HG: CPT | Performed by: INTERNAL MEDICINE

## 2025-06-02 PROCEDURE — 3078F DIAST BP <80 MM HG: CPT | Performed by: INTERNAL MEDICINE

## 2025-06-02 RX ORDER — VALACYCLOVIR HYDROCHLORIDE 1 G/1
1 TABLET, FILM COATED ORAL
COMMUNITY
Start: 2025-05-21

## 2025-06-02 NOTE — PATIENT INSTRUCTIONS
Follow up 6 months    Keep device checks at [facility] device clinic as scheduled      DID YOU KNOW  We have a pharmacy here in the [facility].  They can fill all prescriptions, not just cardiac medications.  Prescriptions from other pharmacies can easily be transferred to the  pharmacy by the  pharmacist on site.   pharmacies offer FREE HOME DELIVERY on medications to anywhere in [place]. They can sync your medications. Typically prescriptions can be ready in 10 - 15 minutes. If pharmacy is unable to fill your  prescription or if cost is more than your paying now the Pharmacist can easily transfer back to your Pharmacy of choice. Pharmacy phone # [phone].     Please bring all medicines, vitamins, and herbal supplements with you in original bottles to every appointment!!!!    Prescriptions will not be filled unless you are compliant with your follow up appointments or have a follow up appointment scheduled as per instruction of your physician. Refills should be requested at the time of your visit.

## 2025-06-02 NOTE — PROGRESS NOTES
"CARDIOLOGY OFFICE VISIT      CHIEF COMPLAINT  Chief Complaint   Patient presents with    Follow-up     \"Just a follow up\"         HISTORY OF PRESENT ILLNESS  HPI  79-year-old male with a past medical history of hypertension. He has a history of coronary artery disease with prior myocardial infarction in 2006 with subsequent percutaneous coronary interventions and stenting of the right coronary artery and then subsequent coronary bypass graft surgery October 2006 with a left internal mammary artery to left anterior descending artery, saphenous vein graft to the ramus and right coronary artery. Stress test in 2014 shows no evidence of ischemia with a left ventricular ejection fraction 72%. History of hyperlipidemia. History of carotid sinus hypersensitivity underwent implantation of a dual-chamber pacemaker (Neurotrack device) May 2008 with recent battery change out due to DAMARI parameters for the battery at the beginning of 2018. His device has been showing episodes of supraventricular tachycardia with brief episodes of ventricular arrhythmias (nonsustained) for the last few years. Since 2020, the device has been showing episodes of nonsustained ventricular tachycardia with episodes up to 9 seconds of duration. His cardiac data includes a cardiac catheterization performed July 2020 that shows left ventricular ejection fraction 60%. Left main artery normal. Left anterior descending with 100% occluded after moderately large first septal  and first of another branch. Proximal left anterior descending artery has 70% stenosis. LIMA to LAD widely patent. Circumflex nondominant. Proximal vessel less than 10% stenosis. Right coronary artery 100% occluded proximally. SVG to ramus intermedius patent. SVG to right coronary artery widely patent with excellent distal runoff. Medical therapy was recommended.      Echocardiogram performed March 2023 shows left ventricular ejection fraction of 60%. 1-2+ tricuspid " regurgitation with mild concentric ventricular hypertrophy.         Carotid US 3/2024     Imaging & Doppler Findings:  Right Plaque Morph: The proximal right internal carotid artery demonstrates intimal thickening and smooth plaque. The distal right common carotid artery demonstrates intimal thickening and smooth plaque.  Left Plaque Morph: The proximal left internal carotid artery demonstrates smooth, homogenous and calcified plaque. The mid left common carotid artery demonstrates homogenous and calcified plaque.    Patient is currently dealing with shingles in the left lower extremity and also his back.    Denies any chest pain or shortness breath or palpitations.    Device interrogation in April 2025 shows dual-chamber pacemaker with battery [employer] 8.5 years.  No significant atrial or ventricular arrhythmias seen.      Past Medical History  Medical History[1]    Social History  Social History[2]    Family History   Family History[3]     Allergies:  RX Allergies[4]     Outpatient Medications:  Current Outpatient Medications   Medication Instructions    aspirin 81 mg EC tablet 1 tablet, Daily    atorvastatin (LIPITOR) 40 mg, oral, Nightly    cholecalciferol (Vitamin D3) 5,000 Units tablet 1 tablet, Daily    dapagliflozin propanediol (Farxiga) 10 mg 1 tablet, Daily    ezetimibe (ZETIA) 10 mg, oral, Daily    losartan (COZAAR) 25 mg, oral, Daily    magnesium oxide (Mag-Ox) 400 mg (241.3 mg magnesium) tablet 1 tablet, Daily    metFORMIN (Glucophage) 500 mg tablet 1 tablet, Every 12 hours scheduled (0630,1830)    metoprolol succinate XL (TOPROL-XL) 100 mg, oral, 2 times daily    nitroglycerin (Nitrostat) 0.4 mg SL tablet PLACE 1 TABLET UNDER THE TONGUE EVERY 5 MINUTES AS NEEDED FOR CHEST PAIN    Rybelsus 3 mg tablet 1 tablet, Daily (0630)    valACYclovir (Valtrex) 1 gram tablet 1 tablet, 3 times daily (0900,1400,1900)          REVIEW OF SYSTEMS  Review of Systems   All other systems reviewed and are  negative.        VITALS  Vitals:    06/02/25 0933   BP: 116/78   Pulse: 76       PHYSICAL EXAM  Cardiovascular:      PMI at left midclavicular line. Normal rate. Regular rhythm. Normal S1. Normal S2.       Murmurs: There is no murmur.      No gallop.  No click. No rub.      Comments: Device left pectoral area. No hematoma or infection noted.     Pulses:     Intact distal pulses.   Edema:     Peripheral edema absent.           ASSESSMENT AND PLAN    Clinical impression     1. Ventricular tachycardia seen on device interrogations  2. Carotid sinus hypersensitivity status post dual-chamber pacemaker implanted in 2008, with recent generator change out in 2024 DAMARI parameters  3. Coronary artery disease with percutaneous coronary interventions in the past and coronary artery bypass graft surgery as described above  4. Normal left ventricular function per echocardiogram as described above  5. Hypertension  6. No significant coronary artery disease per cardiac catheterization as described above  7. Hyperlipidemia  8. History of nonsustained supraventricular tachycardia seen on device interrogations  9. Diabetes mellitus   10. Electrophysiology study with no inducible monomorphic ventricular tachycardia in 2020  11. Covid-19 infection in March 2021. Recovered      Recommendations    From the electrophysiology standpoint he is doing well we will continue with follow-up in office every 6 months or sooner if needed.    Continue beta-blocker therapy.    Follow device clinic as scheduled.    Risk factor modification and lifestyle modification discussed with patient. Diet , exercise and hydration discussed with patient.    I have personally review with patient during this office visit, laboratory data, echocardiogram results, stress test results, Holter-event monitor results prior and after the last electrophysiology visit. All questions has been answered.    Please excuse any errors in grammar or translation related to this  dictation.  Voice recognition software was utilized to prepare this document.    I, Dr. Diallo, personally performed the services described in the documentation as scribed by the nurse in my presence, and confirm it is both accurate and complete.     Scribe Attestation  By signing my name below, I, Luisa Strong LPN , Scribe   attest that this documentation has been prepared under the direction and in the presence of Ag Diallo MD         [1] History reviewed. No pertinent past medical history.  [2]   Social History  Tobacco Use    Smoking status: Former     Types: Cigarettes    Smokeless tobacco: Never   Substance Use Topics    Alcohol use: Yes    Drug use: Not Currently   [3] No family history on file.  [4]   Allergies  Allergen Reactions    Ciprofloxacin Shortness of breath    Tramadol Dizziness and Shortness of breath    Shellfish Containing Products Itching

## 2025-07-21 ENCOUNTER — APPOINTMENT (OUTPATIENT)
Dept: CARDIOLOGY | Facility: CLINIC | Age: 80
End: 2025-07-21
Payer: MEDICARE

## 2025-07-25 ENCOUNTER — HOSPITAL ENCOUNTER (OUTPATIENT)
Dept: CARDIOLOGY | Age: 80
Discharge: HOME | End: 2025-07-25
Payer: MEDICARE

## 2025-07-25 PROCEDURE — 93280 PM DEVICE PROGR EVAL DUAL: CPT | Performed by: INTERNAL MEDICINE

## 2025-07-25 PROCEDURE — 93280 PM DEVICE PROGR EVAL DUAL: CPT

## 2025-08-15 ENCOUNTER — APPOINTMENT (OUTPATIENT)
Dept: CARDIOLOGY | Facility: CLINIC | Age: 80
End: 2025-08-15
Payer: MEDICARE

## 2025-08-15 VITALS
HEART RATE: 68 BPM | HEIGHT: 67 IN | BODY MASS INDEX: 30.83 KG/M2 | DIASTOLIC BLOOD PRESSURE: 64 MMHG | SYSTOLIC BLOOD PRESSURE: 124 MMHG | WEIGHT: 196.4 LBS

## 2025-08-15 DIAGNOSIS — E78.49 OTHER HYPERLIPIDEMIA: ICD-10-CM

## 2025-08-15 DIAGNOSIS — Z95.1 S/P CABG X 3: ICD-10-CM

## 2025-08-15 DIAGNOSIS — I35.1 AORTIC VALVE INSUFFICIENCY, ACQUIRED: Primary | ICD-10-CM

## 2025-08-15 DIAGNOSIS — Z95.828 PRESENCE OF STENT IN ARTERY: ICD-10-CM

## 2025-08-15 DIAGNOSIS — I45.10 RIGHT BUNDLE BRANCH BLOCK (RBBB): ICD-10-CM

## 2025-08-15 DIAGNOSIS — I25.10 ATHEROSCLEROSIS OF NATIVE CORONARY ARTERY OF NATIVE HEART WITHOUT ANGINA PECTORIS: ICD-10-CM

## 2025-08-15 DIAGNOSIS — I10 PRIMARY HYPERTENSION: ICD-10-CM

## 2025-08-15 DIAGNOSIS — I47.29 NSVT (NONSUSTAINED VENTRICULAR TACHYCARDIA) (MULTI): ICD-10-CM

## 2025-08-15 DIAGNOSIS — I10 ESSENTIAL (PRIMARY) HYPERTENSION: ICD-10-CM

## 2025-08-15 PROCEDURE — 1159F MED LIST DOCD IN RCRD: CPT | Performed by: INTERNAL MEDICINE

## 2025-08-15 PROCEDURE — 99214 OFFICE O/P EST MOD 30 MIN: CPT | Performed by: INTERNAL MEDICINE

## 2025-08-15 PROCEDURE — 3074F SYST BP LT 130 MM HG: CPT | Performed by: INTERNAL MEDICINE

## 2025-08-15 PROCEDURE — 3078F DIAST BP <80 MM HG: CPT | Performed by: INTERNAL MEDICINE

## 2025-08-15 RX ORDER — LOSARTAN POTASSIUM 25 MG/1
25 TABLET ORAL DAILY
Qty: 90 TABLET | Refills: 3 | Status: SHIPPED | OUTPATIENT
Start: 2025-08-15

## 2025-12-01 ENCOUNTER — APPOINTMENT (OUTPATIENT)
Dept: CARDIOLOGY | Facility: CLINIC | Age: 80
End: 2025-12-01
Payer: MEDICARE

## 2026-08-07 ENCOUNTER — APPOINTMENT (OUTPATIENT)
Dept: CARDIOLOGY | Facility: CLINIC | Age: 81
End: 2026-08-07
Payer: MEDICARE